# Patient Record
Sex: FEMALE | Race: WHITE | NOT HISPANIC OR LATINO | Employment: UNEMPLOYED | URBAN - METROPOLITAN AREA
[De-identification: names, ages, dates, MRNs, and addresses within clinical notes are randomized per-mention and may not be internally consistent; named-entity substitution may affect disease eponyms.]

---

## 2022-11-04 ENCOUNTER — TELEPHONE (OUTPATIENT)
Dept: GASTROENTEROLOGY | Facility: AMBULARY SURGERY CENTER | Age: 37
End: 2022-11-04

## 2022-11-04 NOTE — TELEPHONE ENCOUNTER
Patients GI provider:  Dr. Delgado    Number to return call: ( [phone]    Reason for call: Pt calling to speak with a nurse due to symptoms, seen in ED yesterday    Scheduled procedure/appointment date if applicable: Apt/procedure N/A

## 2023-01-16 PROBLEM — J98.11 ATELECTASIS OF BOTH LUNGS: Status: ACTIVE | Noted: 2023-01-16

## 2023-04-28 ENCOUNTER — TELEPHONE (OUTPATIENT)
Dept: PSYCHIATRY | Facility: CLINIC | Age: 38
End: 2023-04-28

## 2023-04-28 NOTE — TELEPHONE ENCOUNTER
Contacted patient to inform her that the appointment for 4/28/23 at 9:00a needed to be cancelled due to her provider being out of office  Patient also asked to cancel 5/11/23 at 3:00p and 5/24/23 at 9:00a because she began a new, full-time job  The patient would appreciate a call back from her provider upon her RTO to reschedule  Thank you

## 2023-05-04 ENCOUNTER — TELEPHONE (OUTPATIENT)
Dept: PSYCHIATRY | Facility: CLINIC | Age: 38
End: 2023-05-04

## 2023-05-04 NOTE — TELEPHONE ENCOUNTER
Unfortunately I do not take any late appointments  I do start at 7:30 AM every day  Perhaps she can do a VV early? Otherwise, she may have to transfer care

## 2023-05-04 NOTE — TELEPHONE ENCOUNTER
Writer called patient back to see if she can do early appts and she stated she cannot because she starts work at [employer], I spoke to her about a DAVID to another provider with later hours and she said she will call the office back

## 2023-05-04 NOTE — TELEPHONE ENCOUNTER
Patient is calling regarding cancelling an appointment      Date/Time: 5/8/2023 11:30am    Reason: started new job    Patient was rescheduled: YES [] NO [x]  If yes, when was Patient reschedule for:     Patient requesting call back to reschedule: YES [x] NO []    Patient stated she started a new job and works until Charity Gutierrez went thru providers schedule and did not see anything that could accomodate the patients schedule, please advise

## 2023-05-05 ENCOUNTER — OFFICE VISIT (OUTPATIENT)
Dept: URGENT CARE | Age: 38
End: 2023-05-05

## 2023-05-05 ENCOUNTER — APPOINTMENT (OUTPATIENT)
Dept: URGENT CARE | Age: 38
End: 2023-05-05

## 2023-05-05 VITALS
RESPIRATION RATE: 20 BRPM | BODY MASS INDEX: 27.32 KG/M2 | DIASTOLIC BLOOD PRESSURE: 77 MMHG | SYSTOLIC BLOOD PRESSURE: 120 MMHG | HEIGHT: 66 IN | HEART RATE: 65 BPM | TEMPERATURE: 97.1 F | OXYGEN SATURATION: 99 % | WEIGHT: 170 LBS

## 2023-05-05 DIAGNOSIS — B37.0 ORAL THRUSH: Primary | ICD-10-CM

## 2023-05-05 NOTE — PROGRESS NOTES
[facility]        NAME: Wilberto Robles is a 44 y o  female  : 1985    MRN: [id number]  DATE: May 5, 2023  TIME: 2:55 PM    Assessment and Plan   Oral thrush [B37 0]  1  Oral thrush  al mag oxide-diphenhydramine-lidocaine viscous (MAGIC MOUTHWASH) 1:1:1 suspension    nystatin (MYCOSTATIN) 500,000 units/5 mL suspension            Patient Instructions     Take meds as prescribed  Oral thrush   Follow up with PCP in 3-5 days  Proceed to  ER if symptoms worsen  Chief Complaint     Chief Complaint   Patient presents with    Oral Pain     Patient states she burn her tongue and now she is having mouth , tongue ,swollen and pain for 1 week  Patient states by of the day her pain increase to 8/10  History of Present Illness       HPI   Reports oral pain  Started about 1 week ago  Getting worse  Says tongue feels swollen  Review of records show Hx of use dependency, vit B defficiency and IBS  She says she has not had problems with alcoholism and that last vit B testing was several years ago  Review of Systems   Review of Systems   Constitutional: Negative for fever  HENT: Positive for trouble swallowing (bc of oral pain)  Negative for congestion  Respiratory: Negative for shortness of breath  Gastrointestinal: Negative for diarrhea and vomiting  Neurological: Negative for light-headedness           Current Medications       Current Outpatient Medications:     al mag oxide-diphenhydramine-lidocaine viscous (MAGIC MOUTHWASH) 1:1:1 suspension, Swish and spit 10 mL every 4 (four) hours as needed for mouth pain or discomfort, Disp: 200 mL, Rfl: 0    nystatin (MYCOSTATIN) 500,000 units/5 mL suspension, Apply 5 mL (500,000 Units total) to the mouth or throat 4 (four) times a day Swish and swallow, Disp: 200 mL, Rfl: 0    Banophen 25 MG capsule, , Disp: , Rfl:     Brexpiprazole (Rexulti) 2 MG tablet, Take 1 tablet (2 mg total) by mouth daily, Disp: 90 tablet, Rfl: 1    Elagolix Sodium (Orilissa) 200 MG TABS, Take 200 mg by mouth 2 (two) times a day (Patient not taking: Reported on 3/29/2023), Disp: , Rfl:     hydrOXYzine HCL (ATARAX) 50 mg tablet, Take 1 tablet (50 mg total) by mouth every 6 (six) hours as needed for anxiety, Disp: 90 tablet, Rfl: 2    levothyroxine 137 mcg tablet, Take 1 tablet (137 mcg total) by mouth daily, Disp: 90 tablet, Rfl: 1    LORazepam (ATIVAN) 1 mg tablet, Take 1 tablet (1 mg total) by mouth 2 (two) times a day as needed for anxiety   Do not exceed 10 pills per week (Patient not taking: Reported on 3/29/2023), Disp: 10 tablet, Rfl: 4    mirtazapine (REMERON) 7 5 MG tablet, Take 1 tablet (7 5 mg total) by mouth daily at bedtime (Patient not taking: Reported on 4/5/2023), Disp: 30 tablet, Rfl: 2    oxyCODONE (ROXICODONE) 5 immediate release tablet, PLEASE SEE ATTACHED FOR DETAILED DIRECTIONS, Disp: , Rfl:     pantoprazole (PROTONIX) 40 mg tablet, Take 1 tablet (40 mg total) by mouth 2 (two) times a day before meals (Patient not taking: Reported on 4/5/2023), Disp: 60 tablet, Rfl: 0    prazosin (MINIPRESS) 1 mg capsule, Take 1 capsule (1 mg total) by mouth daily at bedtime Take in addition to 2 mg tablet for a total of 3 mg at bedtime  , Disp: 30 capsule, Rfl: 2    prazosin (MINIPRESS) 2 mg capsule, Take 1 capsule (2 mg total) by mouth daily at bedtime Take in addition to 1 mg tablet for a total of 3 mg at bedtime, Disp: 30 capsule, Rfl: 2    prochlorperazine (COMPAZINE) 5 mg tablet, PRN, Disp: , Rfl:     sertraline (ZOLOFT) 100 mg tablet, Take 2 tablets (200 mg total) by mouth daily, Disp: 180 tablet, Rfl: 2    sertraline (Zoloft) 25 mg tablet, Take 1 tablet (25 mg total) by mouth daily, Disp: 90 tablet, Rfl: 0    tiZANidine (ZANAFLEX) 2 mg tablet, , Disp: , Rfl:     traMADol (ULTRAM) 50 mg tablet, Taking as needed (Patient not taking: Reported on 4/5/2023), Disp: , Rfl:     Current Allergies     Allergies as of 05/05/2023 - Reviewed 05/05/2023   Allergen Reaction Noted    Depakote [valproic acid] Rash 11/13/2017    Keppra [levetiracetam] Rash 07/12/2017    Prochlorperazine Anxiety and Other (See Comments) 11/13/2017    Reglan [metoclopramide] Anxiety 05/21/2017    Amoxicillin Rash 12/07/2018    Ibuprofen GI Intolerance 08/02/2021            The following portions of the patient's history were reviewed and updated as appropriate: allergies, current medications, past family history, past medical history, past social history, past surgical history and problem list      Past Medical History:   Diagnosis Date    Acid reflux     Anxiety     Atypical mole syndrome     last assessed: 9/18/2015    Borderline personality disorder ([facility] [address] [facility]     Cancer ([facility] [address] )     Thyroid CA    Cervical shortening suspected but not found     last assessed: 12/12/2013    Change in bowel habits     diarrhea with blood in stool    Change in mental status     last assessed: 7/11/2017    Chronic cholecystitis     Concussion wth loss of consciousness of 30 minutes or less 02/28/2022    COVID-19 01/2022    Depression     Disease of thyroid gland     thyroid cancer    Dyspepsia     Follicular thyroid cancer ([address] )     Gastric ulcer     GERD (gastroesophageal reflux disease)     Hallucination     Hiatal hernia     Hypothyroid     Irritable bowel syndrome     last assessed: 12/15/2017    Migraine     Psychiatric illness     PTSD (post-traumatic stress disorder)     Rash of hands     medication reaction    Seizures ([facility] [address] [facility]     Self-injurious behavior     Sleep difficulties     Thyroid disorder        Past Surgical History:   Procedure Laterality Date    ABDOMINAL SURGERY      dedra fundiplication    APPENDECTOMY      COLONOSCOPY N/A 10/30/2017    Procedure: COLONOSCOPY;  Surgeon: Dar Vanegas MD;  [facility];   Service: Gastroenterology   [facility] BINH FUNDOPLASTY  11/2015    nissen fundoplication    ESOPHAGOGASTRODUODENOSCOPY N/A 11/13/2017 "Procedure: ESOPHAGOGASTRODUODENOSCOPY (EGD); Surgeon: Blanca Beard MD;  Location: [facility]; Service: Gastroenterology    HYSTERECTOMY      TL[facility]BS    HYSTEROSCOPY W/ ENDOMETRIAL ABLATION  04/29/2016    LAPAROSCOPIC CHOLECYSTECTOMY  2021    LYMPH NODE BIOPSY      excisional    NISSEN FUNDOPLICATION      MD ESOPHAGOGASTRODUODENOSCOPY TRANSORAL DIAGNOSTIC N/A 2/24/2016    Procedure: ESOPHAGOGASTRODUODENOSCOPY (EGD); Surgeon: Preston Del Rio MD;  [facility]; Service: Gastroenterology    MD HYSTEROSCOPY BX ENDOMETRIUM&/POLYPC W/WO D&C N/A 4/29/2016    Procedure: D&C; HYSTEROSCOPY ;  Surgeon: Radha Ceballos DO;  [facility];  Service: Gynecology    MD HYSTEROSCOPY ENDOMETRIAL ABLATION N/A 4/29/2016    Procedure: Zay Dopp ;  Surgeon: Radha Ceballos DO;  [facility];  Service: Gynecology    MD LAPS ABD PRTM&OMENT[facility] DX W[facility] [address] [facility] [address] N/A 5/22/2017    Procedure: LAPAROSCOPY DIAGNOSTIC, APPENDECTOMY;  Surgeon: Sue Gross MD;  [facility];  Service: General    MD LAPS TOTAL HYSTERECT 250 GM/< W/RMVL TUBE/OVARY N/A 9/7/2016    Procedure: TOTAL LAPAROSCOPIC HYSTERECTOMY WITH BILATERAL SALPINGECTOMY ;  Surgeon: Radha Ceballos DO;  [facility];  Service: Gynecology    THYROIDECTOMY  06/2007    total    TUBAL LIGATION      UPPER GASTROINTESTINAL ENDOSCOPY         Family History   Problem Relation Age of Onset    Depression Mother     Depression Father     Diabetes Maternal Grandmother     Hypothyroidism Maternal Grandmother     Skin cancer Maternal Grandmother     Diabetes type II Maternal Grandmother     Crohn's disease Maternal Grandfather     Other Paternal Grandmother         blood dyscrasia    Bipolar disorder Cousin          Medications have been verified          Objective   /77   Pulse 65   Temp (!) 97 1 °F (36 2 °C) (Temporal)   Resp 20   Ht 5' 6\" (1 676 m)   Wt 77 1 kg (170 lb)   SpO2 99%   BMI 27 44 kg/m²   No " LMP recorded  Patient has had a hysterectomy  Physical Exam     Physical Exam  HENT:      Mouth/Throat:      Comments: whitish material on the tongue, removable with a tongue depressor  Consistent with thrush  Increased redness of the tongue and oral mucosa   Also has a foul odor from the mouth

## 2023-05-09 ENCOUNTER — TELEPHONE (OUTPATIENT)
Dept: BEHAVIORAL/MENTAL HEALTH CLINIC | Facility: CLINIC | Age: 38
End: 2023-05-09

## 2023-05-09 NOTE — TELEPHONE ENCOUNTER
The therapist reached out to [facility] in regard to scheduling   Ligia's appointment is scheduled for 6/22/23 at 9:00 AM

## 2023-07-05 ENCOUNTER — DOCUMENTATION (OUTPATIENT)
Dept: BEHAVIORAL/MENTAL HEALTH CLINIC | Facility: CLINIC | Age: 38
End: 2023-07-05

## 2023-07-05 ENCOUNTER — TELEPHONE (OUTPATIENT)
Dept: PSYCHIATRY | Facility: CLINIC | Age: 38
End: 2023-07-05

## 2023-07-05 DIAGNOSIS — F41.1 GAD (GENERALIZED ANXIETY DISORDER): ICD-10-CM

## 2023-07-05 DIAGNOSIS — F60.3 BORDERLINE PERSONALITY DISORDER (HCC): Primary | ICD-10-CM
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
## 2023-07-11 NOTE — TELEPHONE ENCOUNTER
DISCHARGE LETTER for Krystyna Larsen LCSW (certified and regular) placed in outgoing mail on 07/11/23.     Article #:  [id number]    Address:  [address] [place] [address]

## 2023-07-17 ENCOUNTER — TELEPHONE (OUTPATIENT)
Dept: INTERNAL MEDICINE CLINIC | Facility: CLINIC | Age: 38
End: 2023-07-17

## 2023-07-18 ENCOUNTER — OFFICE VISIT (OUTPATIENT)
Dept: INTERNAL MEDICINE CLINIC | Facility: CLINIC | Age: 38
End: 2023-07-18
Payer: COMMERCIAL

## 2023-07-18 VITALS
TEMPERATURE: 98.7 F | OXYGEN SATURATION: 98 % | RESPIRATION RATE: 16 BRPM | BODY MASS INDEX: 28.4 KG/M2 | WEIGHT: 176.7 LBS | HEART RATE: 75 BPM | SYSTOLIC BLOOD PRESSURE: 118 MMHG | DIASTOLIC BLOOD PRESSURE: 80 MMHG | HEIGHT: 66 IN

## 2023-07-18 DIAGNOSIS — J06.9 UPPER RESPIRATORY TRACT INFECTION, UNSPECIFIED TYPE: Primary | ICD-10-CM

## 2023-07-18 PROBLEM — S06.0X1A CONCUSSION WTH LOSS OF CONSCIOUSNESS OF 30 MINUTES OR LESS: Status: RESOLVED | Noted: 2022-02-28 | Resolved: 2023-07-18

## 2023-07-18 PROBLEM — R10.32 LLQ PAIN: Status: RESOLVED | Noted: 2022-03-07 | Resolved: 2023-07-18

## 2023-07-18 PROBLEM — R10.9 ABDOMINAL PAIN: Status: RESOLVED | Noted: 2022-11-20 | Resolved: 2023-07-18

## 2023-07-18 PROBLEM — R56.9 SEIZURE-LIKE ACTIVITY (HCC): Status: RESOLVED | Noted: 2017-07-12 | Resolved: 2023-07-18

## 2023-07-18 PROBLEM — R10.9 INTRACTABLE ABDOMINAL PAIN: Status: RESOLVED | Noted: 2022-11-04 | Resolved: 2023-07-18

## 2023-07-18 PROBLEM — R00.2 HEART PALPITATIONS: Status: RESOLVED | Noted: 2017-07-12 | Resolved: 2023-07-18

## 2023-07-18 PROBLEM — R29.90 MULTIPLE NEUROLOGICAL SYMPTOMS: Status: RESOLVED | Noted: 2021-05-21 | Resolved: 2023-07-18

## 2023-07-18 PROBLEM — K92.1 TARRY STOOLS: Status: RESOLVED | Noted: 2022-11-20 | Resolved: 2023-07-18

## 2023-07-18 PROBLEM — R55 SYNCOPE: Status: RESOLVED | Noted: 2017-10-24 | Resolved: 2023-07-18

## 2023-07-18 PROCEDURE — 99213 OFFICE O/P EST LOW 20 MIN: CPT | Performed by: INTERNAL MEDICINE

## 2023-07-18 RX ORDER — AZITHROMYCIN 250 MG/1
TABLET, FILM COATED ORAL
Qty: 6 TABLET | Refills: 0 | Status: SHIPPED | OUTPATIENT
Start: 2023-07-18 | End: 2023-07-23

## 2023-07-18 NOTE — ASSESSMENT & PLAN NOTE
-c/o metal taste in mouth with sore throat. No thrush noted  -will treat with zpak for presumed URI. Side effects discussed.

## 2023-07-18 NOTE — PROGRESS NOTES
Assessment/Plan:    Problem List Items Addressed This Visit        Respiratory    Upper respiratory tract infection - Primary     -c/o metal taste in mouth with sore throat. No thrush noted  -will treat with zpak for presumed URI. Side effects discussed. Relevant Medications    azithromycin (Zithromax) 250 mg tablet       Subjective:      Patient ID: Jane Somers is a 44 y.o. female. HPI    Had poison oak dermatitis from elbow to fingers, patches down her legs. Was wearing long sleeves because of itchiness. Developed bruises. Seen in ED 6/5. Bruises have subsided significantly. Believes she has thrush. Has sore throat, fatigue, ear pain, chest tightness, jt pains. .  Denies fever, SOB. Has intermittent cough. Occurring for one week. 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 Denies new medications or reflux. The following portions of the patient's history were reviewed and updated as appropriate: allergies, current medications, past family history, past medical history, past social history, past surgical history and problem list.      Current Outpatient Medications:   •  azithromycin (Zithromax) 250 mg tablet, Take 2 tablets (500 mg total) by mouth daily for 1 day, THEN 1 tablet (250 mg total) daily for 4 days. , Disp: 6 tablet, Rfl: 0  •  Brexpiprazole (Rexulti) 2 MG tablet, Take 1 tablet (2 mg total) by mouth daily, Disp: 90 tablet, Rfl: 1  •  hydrOXYzine HCL (ATARAX) 50 mg tablet, Take 1 tablet (50 mg total) by mouth every 6 (six) hours as needed for anxiety, Disp: 90 tablet, Rfl: 2  •  levothyroxine 137 mcg tablet, Take 1 tablet (137 mcg total) by mouth daily, Disp: 90 tablet, Rfl: 1  •  prazosin (MINIPRESS) 1 mg capsule, Take 1 capsule (1 mg total) by mouth daily at bedtime Take in addition to 2 mg tablet for a total of 3 mg at bedtime. , Disp: 30 capsule, Rfl: 2  •  prazosin (MINIPRESS) 2 mg capsule, Take 1 capsule (2 mg total) by mouth daily at bedtime Take in addition to 1 mg tablet for a total of 3 mg at bedtime, Disp: 30 capsule, Rfl: 2  •  prochlorperazine (COMPAZINE) 5 mg tablet, PRN, Disp: , Rfl:   •  sertraline (ZOLOFT) 100 mg tablet, Take 2 tablets (200 mg total) by mouth daily, Disp: 180 tablet, Rfl: 2  •  sertraline (Zoloft) 25 mg tablet, Take 1 tablet (25 mg total) by mouth daily, Disp: 90 tablet, Rfl: 0  •  tiZANidine (ZANAFLEX) 2 mg tablet, , Disp: , Rfl:     Review of Systems   Constitutional: Negative for fever. HENT: Positive for sore throat and trouble swallowing. Negative for congestion, nosebleeds, postnasal drip, rhinorrhea, sinus pressure, sinus pain and sneezing. Respiratory: Positive for cough and chest tightness. Negative for shortness of breath and wheezing. Gastrointestinal: Negative for diarrhea. Denies reflux   Skin: Positive for color change. Objective:    /80 (BP Location: Left arm, Patient Position: Sitting, Cuff Size: Standard)   Pulse 75   Temp 98.7 °F (37.1 °C) (Tympanic Core)   Resp 16   Ht 5' 6" (1.676 m)   Wt 80.2 kg (176 lb 11.2 oz)   SpO2 98%   BMI 28.52 kg/m²      Physical Exam  Vitals reviewed. Constitutional:       General: She is not in acute distress. HENT:      Head: Normocephalic. Right Ear: Tympanic membrane and ear canal normal.      Left Ear: Tympanic membrane and ear canal normal.      Nose:      Comments: Mild R>L nasal turbinate hypertrophy     Mouth/Throat:      Mouth: Mucous membranes are moist.      Pharynx: No oropharyngeal exudate or posterior oropharyngeal erythema. Cardiovascular:      Rate and Rhythm: Normal rate and regular rhythm. Pulses: Normal pulses. Heart sounds: Normal heart sounds. Pulmonary:      Effort: Pulmonary effort is normal.      Breath sounds: Normal breath sounds. Musculoskeletal:      Cervical back: Neck supple. No tenderness. Lymphadenopathy:      Cervical: No cervical adenopathy.    Neurological:      Mental Status: She is alert and oriented to person, place, and time.

## 2023-07-19 ENCOUNTER — TELEPHONE (OUTPATIENT)
Dept: PSYCHIATRY | Facility: CLINIC | Age: 38
End: 2023-07-19

## 2023-07-19 NOTE — PSYCH
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 motivation.  - Counseled to avoid ETOH, illicit substances, and nicotine secondary to the detrimental effects of these substances on mental and physical health. - Encouraged to engage in non-verbal forms of therapy such as art therapy, music therapy, and mindfulness. Aware of 24 hour and weekend coverage for urgent situations accessed by calling [facility] main practice number    Medications Risks/Benefits      Risks, Benefits And Possible Side Effects Of Medications:    Risks, benefits, and possible side effects of medications explained to [facility] including risk of parkinsonian symptoms, Tardive Dyskinesia and metabolic syndrome related to treatment with antipsychotic medications, risk of suicidality and serotonin syndrome related to treatment with antidepressants and risks and benefits of treatment with medications in pregnancy. She verbalizes understanding and agreement for treatment.     Controlled Medication Discussion:     Not applicable    Psychotherapy Provided:     Individual psychotherapy provided: Yes  Counseling was provided during the session today for 16 minutes  Medication education provided to [facility]  Goals discussed during session  Importance of medication and treatment compliance reviewed with Ligia  Cognitive therapy was utilized during the session  Reassurance and supportive therapy provided  Crisis/safety plan discussed with Isak Anaya     Treatment Plan:    Completed and signed during the session: Not applicable - Treatment Plan not due at this session    Visit Time    Visit Start Time: 10:37 AM  Visit Stop Time: 10:58 AM  Total Visit Duration: 20 minutes    Jen Cordero, [address] [place] [address] 07/24/23    This note was completed in part utilizing [address] [place] [address]. Grammatical, translation, syntax errors, random word insertions, spelling mistakes, and incomplete sentences may be an occasional consequence of this system secondary to software limitations with voice recognition, ambient noise, and hardware issues. If you have any questions or concerns about the content, text, or information contained within the body of this dictation, please contact the provider for clarification.

## 2023-07-19 NOTE — TELEPHONE ENCOUNTER
Certificate for the Discharge Letter was signed/received on 7/19/2023. A copy has been scanned into Media.

## 2023-07-19 NOTE — TELEPHONE ENCOUNTER
Patient contacted the office to schedule a follow up visit with provider. Patient is now scheduled for 7/24  at 10:30 virtually.

## 2023-07-20 ENCOUNTER — TELEPHONE (OUTPATIENT)
Dept: BEHAVIORAL/MENTAL HEALTH CLINIC | Facility: CLINIC | Age: 38
End: 2023-07-20

## 2023-07-20 NOTE — TELEPHONE ENCOUNTER
The therapist returned Ligia's phone call in regard to the D/C letter she received. The therapist informed Sarah as to the reason she received the D/C letter. The therapist and Sarah discuss her last visit, work schedule and therapist not calling to check on Ligia's work schedule.  The therapist ask Michael if she wanted to be put back on the therapist schedule and Ligia reports, "I think it's best we part ways."

## 2023-07-24 ENCOUNTER — TELEMEDICINE (OUTPATIENT)
Dept: PSYCHIATRY | Facility: CLINIC | Age: 38
End: 2023-07-24
Payer: COMMERCIAL

## 2023-07-24 DIAGNOSIS — F60.3 BORDERLINE PERSONALITY DISORDER (HCC): ICD-10-CM

## 2023-07-24 DIAGNOSIS — F51.05 INSOMNIA DUE TO MENTAL DISORDER: ICD-10-CM

## 2023-07-24 DIAGNOSIS — F43.10 PTSD (POST-TRAUMATIC STRESS DISORDER): Primary | ICD-10-CM

## 2023-07-24 DIAGNOSIS — F33.0 MILD EPISODE OF RECURRENT MAJOR DEPRESSIVE DISORDER (HCC): ICD-10-CM

## 2023-07-24 DIAGNOSIS — F41.1 GAD (GENERALIZED ANXIETY DISORDER): ICD-10-CM

## 2023-07-24 PROCEDURE — 99213 OFFICE O/P EST LOW 20 MIN: CPT | Performed by: NURSE PRACTITIONER

## 2023-07-24 PROCEDURE — 90833 PSYTX W PT W E/M 30 MIN: CPT | Performed by: NURSE PRACTITIONER

## 2023-07-24 RX ORDER — PRAZOSIN HYDROCHLORIDE 2 MG/1
4 CAPSULE ORAL
Qty: 60 CAPSULE | Refills: 2 | Status: SHIPPED | OUTPATIENT
Start: 2023-07-24

## 2023-07-24 RX ORDER — SERTRALINE HYDROCHLORIDE 25 MG/1
25 TABLET, FILM COATED ORAL DAILY
Qty: 90 TABLET | Refills: 1 | Status: SHIPPED | OUTPATIENT
Start: 2023-07-24

## 2023-07-27 ENCOUNTER — OFFICE VISIT (OUTPATIENT)
Dept: OBGYN CLINIC | Facility: OTHER | Age: 38
End: 2023-07-27
Payer: COMMERCIAL

## 2023-07-27 VITALS
HEIGHT: 66 IN | SYSTOLIC BLOOD PRESSURE: 113 MMHG | BODY MASS INDEX: 28.28 KG/M2 | WEIGHT: 176 LBS | DIASTOLIC BLOOD PRESSURE: 77 MMHG | HEART RATE: 98 BPM

## 2023-07-27 DIAGNOSIS — M25.571 ACUTE RIGHT ANKLE PAIN: Primary | ICD-10-CM

## 2023-07-27 DIAGNOSIS — S93.491A SPRAIN OF ANTERIOR TALOFIBULAR LIGAMENT OF RIGHT ANKLE, INITIAL ENCOUNTER: ICD-10-CM

## 2023-07-27 DIAGNOSIS — M25.671 ANKLE STIFFNESS, RIGHT: ICD-10-CM

## 2023-07-27 DIAGNOSIS — R29.898 ANKLE WEAKNESS: ICD-10-CM

## 2023-07-27 PROCEDURE — 99203 OFFICE O/P NEW LOW 30 MIN: CPT | Performed by: PHYSICIAN ASSISTANT

## 2023-07-27 NOTE — PATIENT INSTRUCTIONS
Crutch Instructions   WHAT YOU NEED TO KNOW:   Crutches are tools that provide support and balance when you walk. You may need 1 or 2 crutches to help support your body weight. You may need crutches if you had surgery or an injury that affects your ability to walk. DISCHARGE INSTRUCTIONS:   Return to the emergency department if:   You have sudden numbness in a hand or arm. Your arm is swollen, red, and warm to the touch. Your fingers feel cold or have cramping pain. Call your doctor if:   Your crutches do not fit. One crutch is longer than the other. Your crutches break or get lost.    The rubber tips of your crutches are split or loose. You get blisters or painful calluses on your hands or armpits. Your armpit is red, sore, or has bumps or pimples. Your arm muscles get weaker the longer you use the crutches. You have questions or concerns about your condition or care. How to use crutches safely:   Support your weight with your arms and hands. Do not support your weight with your armpits. This could hurt the nerves and blood vessels that are in your armpits. Keep your elbow bent when the crutch is in place under your arm. Walk slowly and carefully with crutches. Go up and down stairs and ramps slowly. Stop to rest when you feel tired. Get up slowly to a sitting or standing position. This will help prevent dizziness and fainting. Use your crutches only on firm ground. Use caution when you walk on ice or snow. Wet or waxed floors and smooth cement floors can be slippery. Watch out for small rugs or cords. Create clear pathways between rooms in your home. You may need to move your furniture to do this. Keep your needed items within reach. Carry items in a bag or backpack to keep your hands free. How to walk with crutches:   Place both crutches under your arms. Place your hands on the hand  of the crutches. Place your crutches slightly in front of you.     Position the crutches. The top of the crutches should be about 2 fingers [id number] (about 1½ inches) below your armpits. Place your weight on your hands. The top of the crutches should not press into your armpits. If you have one leg that is injured,  keep it off the floor by bending your knee. Lift the crutches and move them a step ahead of you. Put the rubber ends of the crutches firmly on the ground. Move your injured leg between the crutches and slowly bring your body forward. Shift your weight onto the crutches using your arms. Take a normal step with your uninjured leg. If you are using your crutches for balance,  move your right foot and left crutch forward. Then move your left foot and right crutch forward. Keep walking this way. How to go up stairs with crutches:   Face the stairs. Put the crutches close to the first step. Push onto the crutches and put your uninjured leg on the first step. Put your weight on your uninjured leg that is on the first step. Bring both crutches and the injured leg onto the step at the same time. Make sure the rubber ends of the crutches are completely on the step. When you hold onto a railing with one arm, put both crutches under the other arm. Use the railing to help you go up the stairs. How to go down stairs with crutches:   Stand with the toes of your uninjured leg close to the edge of the step. Bend the knee of your uninjured leg. Slowly lower both crutches along with the injured leg onto the next step. Lean on your crutches. Slowly lower your uninjured leg onto the same step. Place both crutches under one arm while you hold onto the railing with the other arm. How to sit in a chair with crutches:   Make sure the chair is sturdy and will not move. Turn and back up to the chair until you feel the edge of it against the backs of your legs. Keep your injured leg forward. Hold both crutches with one hand.  Use your other hand to reach back and hold on to the chair. Slowly lower your body to the chair. How to get up from a chair with crutches:   Sit on the edge of your chair. Push up with your hands using the crutches or arms of the chair. Put your weight on your uninjured foot as you get up. Keep your injured leg bent at the knee and off the floor. © Copyright [facility] 2022 Information is for End User's use only and may not be sold, redistributed or otherwise used for commercial purposes. The above information is an  only. It is not intended as medical advice for individual conditions or treatments. Talk to your doctor, nurse or pharmacist before following any medical regimen to see if it is safe and effective for you. Ankle Sprain   WHAT YOU NEED TO KNOW:   An ankle sprain happens when 1 or more ligaments in your ankle joint stretch or tear. Ligaments are tough tissues that connect bones. Ligaments support your joints and keep your bones in place. DISCHARGE INSTRUCTIONS:   Return to the emergency department if:   You have severe pain in your ankle. Your foot or toes are cold or numb. Your ankle becomes more weak or unstable (wobbly). You are unable to put any weight on your ankle or foot. Your swelling has increased or returned. Call your doctor if:   Your pain does not go away, even after treatment. You have questions or concerns about your condition or care. Medicines: You may need any of the following:  NSAIDs , such as ibuprofen, help decrease swelling, pain, and fever. This medicine is available with or without a doctor's order. NSAIDs can cause stomach bleeding or kidney problems in certain people. If you take blood thinner medicine, always ask your healthcare provider if NSAIDs are safe for you. Always read the medicine label and follow directions. Acetaminophen  decreases pain and fever. It is available without a doctor's order. Ask how much to take and how often to take it. Follow directions. Read the labels of all other medicines you are using to see if they also contain acetaminophen, or ask your doctor or pharmacist. Acetaminophen can cause liver damage if not taken correctly. Prescription pain medicine  may be given. Ask your healthcare provider how to take this medicine safely. Some prescription pain medicines contain acetaminophen. Do not take other medicines that contain acetaminophen without talking to your healthcare provider. Too much acetaminophen may cause liver damage. Prescription pain medicine may cause constipation. Ask your healthcare provider how to prevent or treat constipation. Take your medicine as directed. Contact your healthcare provider if you think your medicine is not helping or if you have side effects. Tell your provider if you are allergic to any medicine. Keep a list of the medicines, vitamins, and herbs you take. Include the amounts, and when and why you take them. Bring the list or the pill bottles to follow-up visits. Carry your medicine list with you in case of an emergency. Self-care:   Use support devices , such as a brace, cast, or splint, to limit your movement and protect your joint. You may need to use crutches to decrease your pain as you move around. Go to physical therapy  as directed. A physical therapist teaches you exercises to help improve movement and strength, and to decrease pain. Rest  your ankle so that it can heal. Return to normal activities as directed. Apply ice  on your ankle for 15 to 20 minutes every hour or as directed. Use an ice pack, or put crushed ice in a plastic bag. Cover the ice pack or bag with a towel before you put it on your injury. Ice helps prevent tissue damage and decreases swelling and pain. Compress  your ankle. Ask if you should wrap an elastic bandage around your injured ligament.  An elastic bandage provides support and helps decrease swelling and movement so your joint can heal. Wear as long as directed. Elevate  your ankle above the level of your heart as often as you can. This will help decrease swelling and pain. Prop your ankle on pillows or blankets to keep it elevated comfortably. Prevent another ankle sprain:   Let your ankle heal.  Find out how long your ligament needs to heal. Do not do any physical activity until your healthcare provider says it is okay. If you start activity too soon, you may develop a more serious injury. Warm up and stretch before you exercise or play sports. This helps your joints become strong and flexible. Use the right equipment. Always wear shoes that fit well and are made for the activity that you are doing. You may also need ankle supports, elbow and knee pads, or braces. Follow up with your doctor as directed:  Write down your questions so you remember to ask them during your visits. © Copyright [employer] 2022 Information is for End User's use only and may not be sold, redistributed or otherwise used for commercial purposes. The above information is an  only. It is not intended as medical advice for individual conditions or treatments. Talk to your doctor, nurse or pharmacist before following any medical regimen to see if it is safe and effective for you. P.R.I.C.E. Treatment   WHAT YOU NEED TO KNOW:   What is P.R.I.C.E. treatment? P.R.I.C.E. treatment is a 5-step process used to decrease swelling and pain caused by an injury. P.R.I.C.E. stands for protect, rest, ice, compress, and elevate. Start P.R.I.C.E. within 24 to 48 hours of an injury. How do I use P.R.I.C.E. treatment? Protect  your injury from more damage. Support the injured area with a brace or splint. Your healthcare provider will tell you how long to use the brace or splint. Rest  your injured area as directed. You may need to stop using, or keep weight off, the injury for 48 hours or longer.  Your healthcare provider may recommend crutches or another device. Return to your usual activities as directed. Apply ice  on your injured area for 15 to 20 minutes every 4 hours or as directed. Use an ice pack, or put crushed ice in a plastic bag. Cover the bag with a towel before you apply it to your skin. Ice helps prevent tissue damage and decreases swelling and pain. Compress  (keep pressure on) the injured area. Compression will help decrease swelling and support the injured area. Use an elastic bandage, air stirrup, splint, or sling as directed. If you use an elastic bandage, make sure the bandage is not too tight. You should be able to slip 2 fingers between the bandage and your skin. Elevate  the injured area above the level of your heart as often as you can. This will help decrease swelling and pain. Prop the injured area on pillows or blankets to keep it elevated comfortably. When should I seek immediate care? Your pain is severe. You have severe swelling or deformity. You have numbness in the injured area. When should I call my doctor? Your pain and swelling do not go away after a few days. You have questions or concerns about your condition or care. CARE AGREEMENT:   You have the right to help plan your care. Learn about your health condition and how it may be treated. Discuss treatment options with your healthcare providers to decide what care you want to receive. You always have the right to refuse treatment. The above information is an  only. It is not intended as medical advice for individual conditions or treatments. Talk to your doctor, nurse or pharmacist before following any medical regimen to see if it is safe and effective for you. © Copyright [employer] 2022 Information is for End User's use only and may not be sold, redistributed or otherwise used for commercial purposes.

## 2023-07-27 NOTE — PROGRESS NOTES
Orthopaedic Surgery - Office Note  Sabrina Balderas (62 y.o. female)   : 1985   MRN: [id number]  Encounter Date: 2023    Chief Complaint   Patient presents with   • Right Ankle - Pain         Assessment/Plan  Diagnoses and all orders for this visit:    Acute right ankle pain  -     Ambulatory Referral to Physical Therapy; Future  -     Durable Medical Equipment  -     Ambulatory Referral to Sports Medicine; Future    Sprain of anterior talofibular ligament of right ankle, initial encounter  -     Ambulatory Referral to Physical Therapy; Future  -     Durable Medical Equipment  -     Ambulatory Referral to Sports Medicine; Future    Ankle weakness  -     Ambulatory Referral to Physical Therapy; Future  -     Durable Medical Equipment  -     Ambulatory Referral to Sports Medicine; Future    Ankle stiffness, right  -     Ambulatory Referral to Physical Therapy; Future  -     Durable Medical Equipment  -     Ambulatory Referral to Sports Medicine; Future    The diagnosis as well as treatment options were reviewed with the patient in the office today. This injury should not require surgical intervention but will benefit from formal physical therapy. Patient will ice the ankle 20 minutes on 1 hour off 3 times a day. She may weight-bear as tolerated but utilize crutches to avoid any type of limping. She will use aspirin 81 mg 1 tablet twice daily with food for DVT prophylaxis until ambulating normally. I would recommend an oral anti-inflammatory, but patient reports she cannot tolerate them so the best available option will be over-the-counter Tylenol for pain control. Patient does use medicinal marijuana as well but is not a nicotine smoker. She will recheck in 3 to 4 weeks for repeat evaluation with sports medicine. Return for Recheck in 3 to 4 weeks with sports medicine. History of Present Illness  This is a new patient with acute right ankle pain after injury that occurred on 2023. Patient was gardening when the ankle rolled inward causing a pop and extreme pain. Patient was seen at [facility]. Patient was advised at that time to use Tylenol, RICE, and was offered crutches which she initially declined but returned later to obtain. Patient reports she has been using flip-flops since the injury due to inability to fit her foot in his shoe. The pain is located in the lateral aspect of the ankle. She has not had problems like this in the past.  No calf pain or paresthesias are reported. She reports she has been limping but does have crutches. She reports she cannot tolerate oral NSAIDs with her contributing medical histories. Review of Systems  Pertinent items are noted in HPI. All other systems were reviewed and are negative. Physical Exam  /77 (BP Location: Left arm, Patient Position: Sitting, Cuff Size: Standard)   Pulse 98   Ht 5' 6" (1.676 m)   Wt 79.8 kg (176 lb)   BMI 28.41 kg/m²   Cons: Appears well. No apparent distress. Psych: Alert. Oriented x3. Mood and affect normal.  Patient's right ankle has no skin breakdown lesions or signs of infection. She has soft tissue edema over the ATFL where she is point tender. She is nontender on the medial and lateral malleolus. There is no tenderness at the calcaneus. She has no tenderness at the Achilles. She has limited range of motion in all planes secondary to pain and guarding. She is nontender to palpation at the fifth metatarsal.  She has 3 out of 5 strength to dorsiflexion, plantarflexion, inversion, and eversion. Her gait is antalgic. There is no evidence of DVT or infection             Studies Reviewed  XR ANKLE 3+ VW RIGHT    Result Date: 7/26/2023  History:Injury of right ankle, initial encounter . Technique:3 radiographic views of the right ankle . Comparisons: None. Findings: No acute fracture or dislocation visualized. The joint spaces are preserved. There is a plantar calcaneal spur. Bone mineralization appears unremarkable. There is lateral soft tissue swelling. Impressions: Lateral soft tissue swelling. [facility] x-ray report and office note reviewed by myself in the office today. Procedures  No procedures today. Medical, Surgical, Family, and Social History  The patient's medical history, family history, and social history, were reviewed and updated as appropriate.     Past Medical History:   Diagnosis Date   • Abdominal pain 11/20/2022   • Acid reflux    • Anxiety    • Atypical mole syndrome     last assessed: 9/18/2015   • Borderline personality disorder ([address])    • Cancer ([address])     Thyroid CA   • Cervical shortening suspected but not found     last assessed: 12/12/2013   • Change in bowel habits     diarrhea with blood in stool   • Change in mental status     last assessed: 7/11/2017   • Chronic cholecystitis    • Concussion wth loss of consciousness of 30 minutes or less 02/28/2022   • COVID-19 01/2022   • Depression    • Disease of thyroid gland     thyroid cancer   • Dyspepsia    • Follicular thyroid cancer ([address])    • Functional murmur 9/1/2015   • Gastric ulcer    • GERD (gastroesophageal reflux disease)    • Hallucination    • Heart palpitations 7/12/2017   • Hiatal hernia    • Hypothyroid    • Intractable abdominal pain 11/4/2022   • Irritable bowel syndrome     last assessed: 12/15/2017   • LLQ pain 3/7/2022   • Migraine    • Multiple neurological symptoms 5/21/2021   • Psychiatric illness    • PTSD (post-traumatic stress disorder)    • Rash of hands     medication reaction   • Seizure-like activity ([address]) 7/12/2017   • Seizures ([address])    • Self-injurious behavior    • Sleep difficulties    • Syncope 10/24/2017   • Tarry stools 11/20/2022   • Thyroid disorder        Past Surgical History:   Procedure Laterality Date   • ABDOMINAL SURGERY      dedra fundiplication   • APPENDECTOMY     • COLONOSCOPY N/A 10/30/2017    Procedure: COLONOSCOPY;  Surgeon: James Mckeon MD;  Location: [facility]; Service: Gastroenterology   • DENTAL SURGERY     • ESOPHAGOGASTRIC FUNDOPLASTY  11/2015    nissen fundoplication   • ESOPHAGOGASTRODUODENOSCOPY N/A 11/13/2017    Procedure: ESOPHAGOGASTRODUODENOSCOPY (EGD); Surgeon: James Mckeon MD;  Location: [facility]; Service: Gastroenterology   • HYSTERECTOMY      TLHBS   • HYSTEROSCOPY W/ ENDOMETRIAL ABLATION  04/29/2016   • LAPAROSCOPIC CHOLECYSTECTOMY  2021   • LYMPH NODE BIOPSY      excisional   • NISSEN FUNDOPLICATION     • MN ESOPHAGOGASTRODUODENOSCOPY TRANSORAL DIAGNOSTIC N/A 2/24/2016    Procedure: ESOPHAGOGASTRODUODENOSCOPY (EGD); Surgeon: Prateek Loaiza MD;  Location: [facility];   Service: Gastroenterology   • MN HYSTEROSCOPY BX ENDOMETRIUM&/POLYPC W/WO D&C N/A 4/29/2016    Procedure: D&C; HYSTEROSCOPY ;  Surgeon: Paula Sharpe DO;  Location: [facility];  Service: Gynecology   • MN HYSTEROSCOPY ENDOMETRIAL ABLATION N/A 4/29/2016    Procedure: Erin Mcburney ;  Surgeon: Paula Sharpe DO;  Location: [facility];  Service: Gynecology   • MN LAPS ABD PRTM&OMENTUM DX W/WO SPEC BR/WA SPX N/A 5/22/2017    Procedure: LAPAROSCOPY DIAGNOSTIC, APPENDECTOMY;  Surgeon: Messi Neville MD;  Location: [facility];  Service: General   • MN LAPS TOTAL HYSTERECT 250 GM/< W/RMVL TUBE/OVARY N/A 9/7/2016    Procedure: TOTAL LAPAROSCOPIC HYSTERECTOMY WITH BILATERAL SALPINGECTOMY ;  Surgeon: Paula Sharpe DO;  Location: [facility];  Service: Gynecology   • THYROIDECTOMY  06/2007    total   • TUBAL LIGATION     • UPPER GASTROINTESTINAL ENDOSCOPY         Family History   Problem Relation Age of Onset   • Depression Mother    • Depression Father    • Diabetes Maternal Grandmother    • Hypothyroidism Maternal Grandmother    • Skin cancer Maternal Grandmother    • Diabetes type II Maternal Grandmother    • Crohn's disease Maternal Grandfather    • Other Paternal Grandmother         blood dyscrasia   • Bipolar disorder Cousin        Social History     Occupational History   • Occupation: unemployed   Tobacco Use   • Smoking status: Former     Packs/day: 0.25     Years: 5.00     Total pack years: 1.25     Types: Cigarettes     Quit date: 2005     Years since quittin.9   • Smokeless tobacco: Never   Vaping Use   • Vaping Use: Never used   Substance and Sexual Activity   • Alcohol use: Not Currently   • Drug use: Yes     Types: Benzodiazepines, Marijuana     Comment: medical - marijuana; Rx for ativan   • Sexual activity: Not Currently     Partners: Male     Birth control/protection: Surgical       Allergies   Allergen Reactions   • Depakote [Valproic Acid] Rash   • Keppra [Levetiracetam] Rash   • Prochlorperazine Anxiety and Other (See Comments)     Pt denies allergy, states it was "accidentally put in".    Tolerates when given with benadryl   • Reglan [Metoclopramide] Anxiety   • Amoxicillin Rash   • Ibuprofen GI Intolerance     Has ulcer         Current Outpatient Medications:   •  Brexpiprazole (Rexulti) 1 MG tablet, Take 1 tablet (1 mg total) by mouth daily, Disp: 90 tablet, Rfl: 1  •  hydrOXYzine HCL (ATARAX) 50 mg tablet, Take 1 tablet (50 mg total) by mouth every 6 (six) hours as needed for anxiety, Disp: 90 tablet, Rfl: 2  •  levothyroxine 137 mcg tablet, Take 1 tablet (137 mcg total) by mouth daily, Disp: 90 tablet, Rfl: 1  •  prazosin (MINIPRESS) 2 mg capsule, Take 2 capsules (4 mg total) by mouth daily at bedtime, Disp: 60 capsule, Rfl: 2  •  prochlorperazine (COMPAZINE) 5 mg tablet, PRN, Disp: , Rfl:   •  sertraline (ZOLOFT) 100 mg tablet, Take 2 tablets (200 mg total) by mouth daily, Disp: 180 tablet, Rfl: 2  •  sertraline (Zoloft) 25 mg tablet, Take 1 tablet (25 mg total) by mouth daily, Disp: 90 tablet, Rfl: 1  •  tiZANidine (ZANAFLEX) 2 mg tablet, , Disp: , Rfl:       Zeeshan Gandara PA-C

## 2023-07-28 ENCOUNTER — TELEPHONE (OUTPATIENT)
Dept: OBGYN CLINIC | Facility: HOSPITAL | Age: 38
End: 2023-07-28

## 2023-07-28 NOTE — TELEPHONE ENCOUNTER
Caller: Patient    Doctor: Jere Sharif    Reason for call: Patient is calling because today she has a red bruise on the ankle and she would like to know if that is normal or something to be concerned about. She stated she will be uploading a photo into her chart.     Call back#: [phone]

## 2023-08-01 ENCOUNTER — EVALUATION (OUTPATIENT)
Dept: PHYSICAL THERAPY | Facility: CLINIC | Age: 38
End: 2023-08-01
Payer: COMMERCIAL

## 2023-08-01 DIAGNOSIS — R29.898 ANKLE WEAKNESS: ICD-10-CM

## 2023-08-01 DIAGNOSIS — S93.491A SPRAIN OF ANTERIOR TALOFIBULAR LIGAMENT OF RIGHT ANKLE, INITIAL ENCOUNTER: Primary | ICD-10-CM

## 2023-08-01 DIAGNOSIS — M25.671 ANKLE STIFFNESS, RIGHT: ICD-10-CM

## 2023-08-01 DIAGNOSIS — M25.571 ACUTE RIGHT ANKLE PAIN: ICD-10-CM

## 2023-08-01 PROCEDURE — 97110 THERAPEUTIC EXERCISES: CPT | Performed by: PHYSICAL THERAPIST

## 2023-08-01 PROCEDURE — 97161 PT EVAL LOW COMPLEX 20 MIN: CPT | Performed by: PHYSICAL THERAPIST

## 2023-08-01 PROCEDURE — 97140 MANUAL THERAPY 1/> REGIONS: CPT | Performed by: PHYSICAL THERAPIST

## 2023-08-01 NOTE — PROGRESS NOTES
PT Evaluation     Today's date: 2023  Patient name: Nini Stafford  :   MRN: [id number]  Referring provider: JEANMARIE Aponte*  Dx:   Encounter Diagnosis     ICD-10-CM    1. Acute right ankle pain  M25.571 Ambulatory Referral to Physical Therapy      2. Sprain of anterior talofibular ligament of right ankle, initial encounter  S93.491A Ambulatory Referral to Physical Therapy      3. Ankle weakness  R29.898 Ambulatory Referral to Physical Therapy      4. Ankle stiffness, right  M25.671 Ambulatory Referral to Physical Therapy                     Assessment  Assessment details: Pt presents with s/s consistent with a ATFL Gr II-III sprain. Pt will benefit from PT to address impairments and restore function. Impairments: abnormal gait, abnormal or restricted ROM, activity intolerance, impaired balance, impaired physical strength, lacks appropriate home exercise program, pain with function and weight-bearing intolerance    Goals  ST-4 weeks. 1.  Pt will decrease pain > 75%. 2.  Pt will restore normal ROM. 3.  Pt will ambulate without pain. LT-8 weeks. 1.  Pt will decrease pain > 90%. 2.  Pt will resume fitness routine. Plan  Planned modality interventions: low level laser therapy  Planned therapy interventions: manual therapy, neuromuscular re-education, patient education, self care, strengthening, stretching, therapeutic activities, therapeutic exercise, flexibility, functional ROM exercises, gait training, graded activity, graded exercise and home exercise program  Frequency: 2x week  Duration in weeks: 6        Subjective Evaluation    History of Present Illness  Mechanism of injury: Pt is a 44 yof c/o R lateral ankle pain after rolling her ankle into INV when she stepped on a rain spout on 23.     Patient Goals  Patient goals for therapy: decreased edema, decreased pain, improved balance, increased motion, increased strength, independence with ADLs/IADLs and return to sport/leisure activities    Pain  Current pain ratin  At best pain rating: 3  At worst pain rating: 10  Location: R lateral ankle  Quality: dull ache, throbbing and pressure  Relieving factors: ice and medications (elevation)  Aggravating factors: sitting, standing and walking  Progression: no change      Diagnostic Tests  X-ray: abnormal (23 plantar calcaneal spur)        Objective     Active Range of Motion     Right Ankle/Foot   Dorsiflexion (ke): -10 degrees with pain  Plantar flexion: 20 degrees with pain  Inversion: 3 degrees with pain  Eversion: 5 degrees with pain  Great toe flexion: 5 degrees with pain  Great toe extension: 3 degrees with pain  Lesser toes: 10 degrees with pain    Passive Range of Motion     Right Ankle/Foot    Dorsiflexion (ke): -8 degrees with pain  Plantar flexion: 25 degrees with pain  Inversion: 5 degrees with pain  Eversion: 5 degrees with pain  Great toe flexion: 10 degrees with pain  Great toe extension: 10 degrees with pain  Lesser toes: 10 degrees with pain    Additional Passive Range of Motion Details  Edema: 3+. Pt was ambulating without crutches or a brace. Educated to unload utilizing crutches to allow edema to improve will wrap until able to tolerate brace. Strength/Myotome Testing     Right Ankle/Foot   Dorsiflexion: 3-  Plantar flexion: 3-  Inversion: 3-  Eversion: 3-  Great toe flexion: 3-  Great toe extension: 3-    Tests     Right Ankle/Foot   Positive for anterior drawer and inversion talar tilt. Precautions:  WBAT. Dx: R ATFL Gr II-III sprain.     Manuals             R ankle DF/PF PROM             R ankle Laser 6000J                                      Neuro Re-Ed                                                                                                        Ther Ex             Ankle DF/PF AROM 1x20            Supine GA stretch 10"x3            Seated towel scrunches 1x20            Seated heel-toe raises Ther Activity                                       Gait Training             Alter G  20%                        Modalities

## 2023-08-03 ENCOUNTER — APPOINTMENT (OUTPATIENT)
Dept: PHYSICAL THERAPY | Facility: CLINIC | Age: 38
End: 2023-08-03
Payer: COMMERCIAL

## 2023-08-08 ENCOUNTER — OFFICE VISIT (OUTPATIENT)
Dept: PHYSICAL THERAPY | Facility: CLINIC | Age: 38
End: 2023-08-08
Payer: COMMERCIAL

## 2023-08-08 DIAGNOSIS — M25.571 ACUTE RIGHT ANKLE PAIN: Primary | ICD-10-CM

## 2023-08-08 DIAGNOSIS — S93.491A SPRAIN OF ANTERIOR TALOFIBULAR LIGAMENT OF RIGHT ANKLE, INITIAL ENCOUNTER: ICD-10-CM

## 2023-08-08 PROCEDURE — 97110 THERAPEUTIC EXERCISES: CPT | Performed by: PHYSICAL THERAPIST

## 2023-08-08 PROCEDURE — 97116 GAIT TRAINING THERAPY: CPT | Performed by: PHYSICAL THERAPIST

## 2023-08-08 NOTE — PROGRESS NOTES
Daily Note     Today's date: 2023  Patient name: Stacey Ashby  :   MRN: [id number]  Referring provider: JEANMARIE Rodriguez*  Dx:   Encounter Diagnosis     ICD-10-CM    1. Acute right ankle pain  M25.571       2. Sprain of anterior talofibular ligament of right ankle, initial encounter  S93.750I                      Subjective: Pt reports 4/10 lateral ankle pain with amb. Objective: See treatment diary below    Assessment: Edema reduced, ROM improved. Introduced Nishant Quiñones and pt was able to normalize gait. Plan: Cont POC. Precautions:  WBAT. Dx: R ATFL Gr II-III sprain.     Manuals            R ankle DF/PF PROM  10"x5           R ankle Laser 6000J 5000J                                     Neuro Re-Ed                                                                                                        Ther Ex             Ankle DF/PF AROM 1x20 1x20           Supine GA stretch 10"x3 10"x3           Standing towel scrunches 1x20 2#/ 1x30           Seated heel-toe raises             TB PF  Y/ 1x20                                                  Ther Activity                                       Gait Training             Alter G  50% 1 mph x 10 min                        Modalities

## 2023-08-10 ENCOUNTER — APPOINTMENT (OUTPATIENT)
Dept: PHYSICAL THERAPY | Facility: CLINIC | Age: 38
End: 2023-08-10
Payer: COMMERCIAL

## 2023-08-11 DIAGNOSIS — F51.05 INSOMNIA DUE TO MENTAL DISORDER: ICD-10-CM

## 2023-08-11 DIAGNOSIS — F43.10 PTSD (POST-TRAUMATIC STRESS DISORDER): ICD-10-CM

## 2023-08-11 RX ORDER — PRAZOSIN HYDROCHLORIDE 2 MG/1
4 CAPSULE ORAL
Qty: 60 CAPSULE | Refills: 1 | Status: SHIPPED | OUTPATIENT
Start: 2023-08-11

## 2023-08-15 ENCOUNTER — OFFICE VISIT (OUTPATIENT)
Dept: PHYSICAL THERAPY | Facility: CLINIC | Age: 38
End: 2023-08-15
Payer: COMMERCIAL

## 2023-08-15 DIAGNOSIS — S93.491A SPRAIN OF ANTERIOR TALOFIBULAR LIGAMENT OF RIGHT ANKLE, INITIAL ENCOUNTER: ICD-10-CM

## 2023-08-15 DIAGNOSIS — R29.898 ANKLE WEAKNESS: ICD-10-CM

## 2023-08-15 DIAGNOSIS — M25.671 ANKLE STIFFNESS, RIGHT: ICD-10-CM

## 2023-08-15 DIAGNOSIS — M25.571 ACUTE RIGHT ANKLE PAIN: Primary | ICD-10-CM

## 2023-08-15 PROCEDURE — 97110 THERAPEUTIC EXERCISES: CPT | Performed by: PHYSICAL THERAPIST

## 2023-08-15 PROCEDURE — 97116 GAIT TRAINING THERAPY: CPT | Performed by: PHYSICAL THERAPIST

## 2023-08-15 NOTE — PROGRESS NOTES
Daily Note     Today's date: 8/15/2023  Patient name: Preethi Garcia  :   MRN: [id number]  Referring provider: JEANMARIE Renee*  Dx:   Encounter Diagnosis     ICD-10-CM    1. Acute right ankle pain  M25.571       2. Sprain of anterior talofibular ligament of right ankle, initial encounter  S93.491A       3. Ankle weakness  R29.898       4. Ankle stiffness, right  M25.671                      Subjective: Pt reports 5/10 lateral ankle pain with amb. Objective: See treatment diary below    Assessment: Pt demonstrated increased edema, decreased pain-free DF and PF ROM. Pt could not recall a reason. Discussed purchasing an ankle brace for stability and decreasing time on her feet and increasing elevating and icing breaks t/o the day. Plan: Cont POC. Precautions:  WBAT. Dx: R ATFL Gr II-III sprain.     Manuals 8/1 8/8 8/15          R ankle DF/PF PROM  10"x5 5"x20          R ankle Laser 6000J 5000J 8000J                                    Neuro Re-Ed                                                                                                        Ther Ex             Ankle DF/PF AROM 1x20 1x20 1x20          Supine GA stretch 10"x3 10"x3 10"x3          Standing towel scrunches 1x20 2#/ 1x30 2#/ 1x30          Seated heel-toe raises             TB PF  Y/ 1x20 R/ 3x10                                                 Ther Activity                                       Gait Training             Alter G  50% 1 mph x 10 min 50% 1 mph x 12 min                       Modalities

## 2023-08-17 ENCOUNTER — OFFICE VISIT (OUTPATIENT)
Dept: PHYSICAL THERAPY | Facility: CLINIC | Age: 38
End: 2023-08-17
Payer: COMMERCIAL

## 2023-08-17 DIAGNOSIS — S93.491A SPRAIN OF ANTERIOR TALOFIBULAR LIGAMENT OF RIGHT ANKLE, INITIAL ENCOUNTER: ICD-10-CM

## 2023-08-17 DIAGNOSIS — M25.571 ACUTE RIGHT ANKLE PAIN: Primary | ICD-10-CM

## 2023-08-17 PROCEDURE — 97116 GAIT TRAINING THERAPY: CPT | Performed by: PHYSICAL THERAPIST

## 2023-08-17 PROCEDURE — 97110 THERAPEUTIC EXERCISES: CPT | Performed by: PHYSICAL THERAPIST

## 2023-08-17 NOTE — PROGRESS NOTES
Daily Note     Today's date: 2023  Patient name: Ambika Guerrero  :   MRN: [id number]  Referring provider: JENAMARIE Hay*  Dx:   Encounter Diagnosis     ICD-10-CM    1. Acute right ankle pain  M25.571       2. Sprain of anterior talofibular ligament of right ankle, initial encounter  S93.325W                      Subjective: Pt reports some relief with OTC ankle brace, 5-6/10 lateral ankle pain. Objective: See treatment diary below    Assessment: Pt required DF ROM and distal fib DF MWM to gain 0 deg of ROM. Plan: Cont POC. Precautions:  WBAT. Dx: R ATFL Gr II-III sprain.     Manuals 8/1 8/8 8/15 8/17         R ankle DF/PF PROM  10"x5 5"x20 5"x20         R distal fib DF MWM    1x20         R ankle Laser 6000J 5000J 8000J 8000J                                   Neuro Re-Ed                                                                                                        Ther Ex             Ankle DF/PF AROM 1x20 1x20 1x20 1x20         Supine GA stretch 10"x3 10"x3 10"x3 15"x3         Standing towel scrunches 1x20 2#/ 1x30 2#/ 1x30 2#/ 1x30         Seated heel-toe raises             TB PF  Y/ 1x20 R/ 3x10 R/ 3x10                                                Ther Activity                                       Gait Training             Alter G  50% 1 mph x 10 min 50% 1 mph x 12 min 60% 1 mph x 10 min                      Modalities

## 2023-08-21 ENCOUNTER — APPOINTMENT (OUTPATIENT)
Dept: PHYSICAL THERAPY | Facility: CLINIC | Age: 38
End: 2023-08-21
Payer: COMMERCIAL

## 2023-08-23 ENCOUNTER — APPOINTMENT (OUTPATIENT)
Dept: PHYSICAL THERAPY | Facility: CLINIC | Age: 38
End: 2023-08-23
Payer: COMMERCIAL

## 2023-08-24 ENCOUNTER — OFFICE VISIT (OUTPATIENT)
Dept: PHYSICAL THERAPY | Facility: CLINIC | Age: 38
End: 2023-08-24
Payer: COMMERCIAL

## 2023-08-24 ENCOUNTER — APPOINTMENT (OUTPATIENT)
Dept: PHYSICAL THERAPY | Facility: CLINIC | Age: 38
End: 2023-08-24
Payer: COMMERCIAL

## 2023-08-24 DIAGNOSIS — S93.491A SPRAIN OF ANTERIOR TALOFIBULAR LIGAMENT OF RIGHT ANKLE, INITIAL ENCOUNTER: ICD-10-CM

## 2023-08-24 DIAGNOSIS — M25.671 ANKLE STIFFNESS, RIGHT: ICD-10-CM

## 2023-08-24 DIAGNOSIS — M25.571 ACUTE RIGHT ANKLE PAIN: Primary | ICD-10-CM

## 2023-08-24 DIAGNOSIS — R29.898 ANKLE WEAKNESS: ICD-10-CM

## 2023-08-24 PROCEDURE — 97110 THERAPEUTIC EXERCISES: CPT | Performed by: PHYSICAL THERAPIST

## 2023-08-24 PROCEDURE — 97116 GAIT TRAINING THERAPY: CPT | Performed by: PHYSICAL THERAPIST

## 2023-08-24 NOTE — PROGRESS NOTES
Daily Note     Today's date: 2023  Patient name: Oscar Day  : [birth year]  MRN: [id number]  Referring provider: JEANMARIE Harris*  Dx:   Encounter Diagnosis     ICD-10-CM    1. Acute right ankle pain  M25.571       2. Sprain of anterior talofibular ligament of right ankle, initial encounter  S93.491A       3. Ankle weakness  R29.898       4. Ankle stiffness, right  M25.671                      Subjective: Pt reports some relief with OTC ankle brace, 5-6/10 lateral ankle pain. Objective: See treatment diary below    Assessment: Pt required DF ROM and distal fib DF MWM to gain 0 deg of ROM. Plan: Cont POC. Precautions:  WBAT. Dx: R ATFL Gr II-III sprain.     Manuals 8/1 8/8 8/15 8/17 8/24        R ankle DF/PF PROM  10"x5 5"x20 5"x20 5"x20        R distal fib DF MWM    1x20 1x10*        R ankle Laser 6000J 5000J 8000J 8000J 6800J                                  Neuro Re-Ed                                                                                                        Ther Ex             Ankle DF/PF AROM 1x20 1x20 1x20 1x20 1x20        Supine GA stretch 10"x3 10"x3 10"x3 15"x3 15"x3        Standing towel scrunches 1x20 2#/ 1x30 2#/ 1x30 2#/ 1x30         Seated heel-toe raises             TB PF  Y/ 1x20 R/ 3x10 R/ 3x10 G/ 3x10        Alter G DL heel raises     50%/ 3x10        Alter G standing GA stretch     50%/ 15"x3                     Ther Activity                                       Gait Training             Alter G  50% 1 mph x 10 min 50% 1 mph x 12 min 60% 1 mph x 10 min 50% 1 mph x 11 min 60% 1.2 mph x 10 min                    Modalities

## 2023-09-14 ENCOUNTER — APPOINTMENT (OUTPATIENT)
Dept: RADIOLOGY | Age: 38
End: 2023-09-14
Payer: COMMERCIAL

## 2023-09-14 ENCOUNTER — OFFICE VISIT (OUTPATIENT)
Dept: INTERNAL MEDICINE CLINIC | Facility: CLINIC | Age: 38
End: 2023-09-14
Payer: COMMERCIAL

## 2023-09-14 VITALS
WEIGHT: 170 LBS | DIASTOLIC BLOOD PRESSURE: 82 MMHG | BODY MASS INDEX: 27.32 KG/M2 | TEMPERATURE: 98.3 F | HEIGHT: 66 IN | HEART RATE: 122 BPM | OXYGEN SATURATION: 97 % | SYSTOLIC BLOOD PRESSURE: 124 MMHG

## 2023-09-14 DIAGNOSIS — M53.3 SACRAL PAIN: Primary | ICD-10-CM

## 2023-09-14 DIAGNOSIS — M53.3 SACRAL PAIN: ICD-10-CM

## 2023-09-14 DIAGNOSIS — Z91.81 STATUS POST FALL: ICD-10-CM

## 2023-09-14 PROBLEM — J06.9 UPPER RESPIRATORY TRACT INFECTION: Status: RESOLVED | Noted: 2023-07-18 | Resolved: 2023-09-14

## 2023-09-14 PROCEDURE — 72220 X-RAY EXAM SACRUM TAILBONE: CPT

## 2023-09-14 PROCEDURE — 72100 X-RAY EXAM L-S SPINE 2/3 VWS: CPT

## 2023-09-14 PROCEDURE — 99214 OFFICE O/P EST MOD 30 MIN: CPT | Performed by: INTERNAL MEDICINE

## 2023-09-14 RX ORDER — OXYCODONE HYDROCHLORIDE 5 MG/1
5 TABLET ORAL EVERY 8 HOURS PRN
Qty: 10 TABLET | Refills: 0 | Status: SHIPPED | OUTPATIENT
Start: 2023-09-14

## 2023-09-14 RX ORDER — HYDROCODONE BITARTRATE AND ACETAMINOPHEN 5; 325 MG/1; MG/1
TABLET ORAL
COMMUNITY
Start: 2023-07-31

## 2023-09-14 RX ORDER — PREGABALIN 100 MG/1
100 CAPSULE ORAL 3 TIMES DAILY
COMMUNITY
Start: 2023-08-31

## 2023-09-14 NOTE — PROGRESS NOTES
Assessment/Plan:    Problem List Items Addressed This Visit    None  Visit Diagnoses     Sacral pain    -  Primary    -s/p mechanical fall down four steps on 9/9  -r/o LS and sacrum fx  -oxocodone q8h prn severe pain, cont lyrica  -apply ice    Relevant Medications    oxyCODONE (Roxicodone) 5 immediate release tablet    Other Relevant Orders    XR sacrum and coccyx (Completed)    XR spine lumbar 2 or 3 views injury (Completed)    Status post fall        Relevant Medications    oxyCODONE (Roxicodone) 5 immediate release tablet    Other Relevant Orders    XR sacrum and coccyx (Completed)    XR spine lumbar 2 or 3 views injury (Completed)          Subjective:      Patient ID: Misael Camarillo is a 44 y.o. female. HPI    She slipped and fell down four steps. Her tailbone on the front lip of each wooden step as she went down until she hit the bottom of living room. Occurred five days ago. She has been applying ice. Feels burning sensation. She has been laying on her side when she can. Reports pain in her rectum. Has tried pillows propped up and getting up and sitting down has caused pain. She is moving her bowels ok.     The following portions of the patient's history were reviewed and updated as appropriate: allergies, current medications, past family history, past medical history, past social history, past surgical history and problem list.      Current Outpatient Medications:   •  Brexpiprazole (Rexulti) 1 MG tablet, Take 1 tablet (1 mg total) by mouth daily, Disp: 90 tablet, Rfl: 1  •  hydrOXYzine HCL (ATARAX) 50 mg tablet, Take 1 tablet (50 mg total) by mouth every 6 (six) hours as needed for anxiety, Disp: 90 tablet, Rfl: 2  •  levothyroxine 137 mcg tablet, Take 1 tablet (137 mcg total) by mouth daily, Disp: 90 tablet, Rfl: 1  •  oxyCODONE (Roxicodone) 5 immediate release tablet, Take 1 tablet (5 mg total) by mouth every 8 (eight) hours as needed for severe pain (sacrum pain) Max Daily Amount: 15 mg, Disp: 10 tablet, Rfl: 0  •  prazosin (MINIPRESS) 2 mg capsule, Take 2 capsules (4 mg total) by mouth daily at bedtime, Disp: 60 capsule, Rfl: 1  •  pregabalin (LYRICA) 100 mg capsule, Take 100 mg by mouth 3 (three) times a day, Disp: , Rfl:   •  prochlorperazine (COMPAZINE) 5 mg tablet, PRN, Disp: , Rfl:   •  sertraline (ZOLOFT) 100 mg tablet, Take 2 tablets (200 mg total) by mouth daily, Disp: 180 tablet, Rfl: 2  •  sertraline (Zoloft) 25 mg tablet, Take 1 tablet (25 mg total) by mouth daily, Disp: 90 tablet, Rfl: 1  •  HYDROcodone-acetaminophen (NORCO) 5-325 mg per tablet, PLEASE SEE ATTACHED FOR DETAILED DIRECTIONS (Patient not taking: Reported on 9/14/2023), Disp: , Rfl:   •  tiZANidine (ZANAFLEX) 2 mg tablet, , Disp: , Rfl:     Review of Systems   Constitutional: Negative for fever. Gastrointestinal: Negative for blood in stool. Genitourinary: Negative for difficulty urinating. Musculoskeletal: Positive for arthralgias and back pain. Neurological: Positive for numbness. Objective:    /82   Pulse (!) 122   Temp 98.3 °F (36.8 °C)   Ht 5' 6" (1.676 m)   Wt 77.1 kg (170 lb)   SpO2 97%   BMI 27.44 kg/m²      Physical Exam  Constitutional:       Comments: Appears uncomfortable, leaning over exam table   Skin:     Findings: Bruising (14cm bruise over L sacrum, swollen, tender to touch. Faint bruising on lumbar spinous process) present. Neurological:      Mental Status: She is alert and oriented to person, place, and time.

## 2023-10-20 ENCOUNTER — OFFICE VISIT (OUTPATIENT)
Dept: INTERNAL MEDICINE CLINIC | Facility: CLINIC | Age: 38
End: 2023-10-20
Payer: COMMERCIAL

## 2023-10-20 VITALS
HEIGHT: 66 IN | SYSTOLIC BLOOD PRESSURE: 114 MMHG | RESPIRATION RATE: 17 BRPM | WEIGHT: 175 LBS | HEART RATE: 76 BPM | BODY MASS INDEX: 28.12 KG/M2 | TEMPERATURE: 99.8 F | OXYGEN SATURATION: 97 % | DIASTOLIC BLOOD PRESSURE: 90 MMHG

## 2023-10-20 DIAGNOSIS — Z85.850 HISTORY OF THYROID CANCER: ICD-10-CM

## 2023-10-20 DIAGNOSIS — J20.9 ACUTE BRONCHITIS, UNSPECIFIED ORGANISM: ICD-10-CM

## 2023-10-20 DIAGNOSIS — J98.01 BRONCHOSPASM: Primary | ICD-10-CM

## 2023-10-20 PROCEDURE — 99214 OFFICE O/P EST MOD 30 MIN: CPT | Performed by: INTERNAL MEDICINE

## 2023-10-20 RX ORDER — LEVALBUTEROL TARTRATE 45 UG/1
1-2 AEROSOL, METERED ORAL EVERY 4 HOURS PRN
Qty: 15 G | Refills: 0 | Status: SHIPPED | OUTPATIENT
Start: 2023-10-20

## 2023-10-20 RX ORDER — AZITHROMYCIN 250 MG/1
TABLET, FILM COATED ORAL
Qty: 6 TABLET | Refills: 0 | Status: SHIPPED | OUTPATIENT
Start: 2023-10-20 | End: 2023-10-25

## 2023-10-20 RX ORDER — LEVOTHYROXINE SODIUM 137 UG/1
137 TABLET ORAL DAILY
Qty: 90 TABLET | Refills: 0 | Status: SHIPPED | OUTPATIENT
Start: 2023-10-20

## 2023-10-20 RX ORDER — FLUTICASONE FUROATE AND VILANTEROL 100; 25 UG/1; UG/1
1 POWDER RESPIRATORY (INHALATION) DAILY
Qty: 60 BLISTER | Refills: 0 | Status: SHIPPED | OUTPATIENT
Start: 2023-10-20 | End: 2024-04-17

## 2023-10-20 NOTE — PROGRESS NOTES
Assessment/Plan:    Problem List Items Addressed This Visit    None  Visit Diagnoses     Bronchospasm    -  Primary    -secondary to bronchitis  -start BREO and xoponex prn, side effects discussed. Relevant Medications    Fluticasone Furoate-Vilanterol 100-25 mcg/actuation inhaler    levalbuterol (Xopenex HFA) 45 mcg/act inhaler    Acute bronchitis, unspecified organism        -if not improved despite inhalers, then start zpak  -take delsym and mucinex for cough    Relevant Medications    Fluticasone Furoate-Vilanterol 100-25 mcg/actuation inhaler    levalbuterol (Xopenex HFA) 45 mcg/act inhaler    azithromycin (Zithromax) 250 mg tablet          Subjective:      Patient ID: Tracy Saenz is a 44 y.o. female. HPI    Has had allergy symptoms one week ago, did not think much of it. Sneezing, watery eyes improved. All of a sudden had cough 3d ago start then air has felt thick to her. She feels she is working harder to breathe. Throat hurts from coughing. Very minimal rhinorrhea. Has felt feverish, chills/sweats, fatigue. Tested neg for COVID at home. She has not taken anything for her symptoms. The following portions of the patient's history were reviewed and updated as appropriate: allergies, current medications, past family history, past medical history, past social history, past surgical history and problem list.      Current Outpatient Medications:   •  azithromycin (Zithromax) 250 mg tablet, Take 2 tablets (500 mg total) by mouth daily for 1 day, THEN 1 tablet (250 mg total) daily for 4 days. , Disp: 6 tablet, Rfl: 0  •  Brexpiprazole (Rexulti) 1 MG tablet, Take 1 tablet (1 mg total) by mouth daily, Disp: 90 tablet, Rfl: 1  •  Fluticasone Furoate-Vilanterol 100-25 mcg/actuation inhaler, Inhale 1 puff daily Rinse mouth after use., Disp: 60 blister, Rfl: 0  •  hydrOXYzine HCL (ATARAX) 50 mg tablet, Take 1 tablet (50 mg total) by mouth every 6 (six) hours as needed for anxiety, Disp: 90 tablet, Rfl: 2  •  levalbuterol (Xopenex HFA) 45 mcg/act inhaler, Inhale 1-2 puffs every 4 (four) hours as needed for shortness of breath, Disp: 15 g, Rfl: 0  •  levothyroxine 137 mcg tablet, Take 1 tablet (137 mcg total) by mouth daily, Disp: 90 tablet, Rfl: 1  •  prazosin (MINIPRESS) 2 mg capsule, Take 2 capsules (4 mg total) by mouth daily at bedtime, Disp: 60 capsule, Rfl: 1  •  pregabalin (LYRICA) 100 mg capsule, Take 100 mg by mouth 3 (three) times a day, Disp: , Rfl:   •  prochlorperazine (COMPAZINE) 5 mg tablet, PRN, Disp: , Rfl:   •  sertraline (ZOLOFT) 100 mg tablet, Take 2 tablets (200 mg total) by mouth daily, Disp: 180 tablet, Rfl: 2  •  sertraline (Zoloft) 25 mg tablet, Take 1 tablet (25 mg total) by mouth daily, Disp: 90 tablet, Rfl: 1  •  HYDROcodone-acetaminophen (NORCO) 5-325 mg per tablet, PLEASE SEE ATTACHED FOR DETAILED DIRECTIONS (Patient not taking: Reported on 9/14/2023), Disp: , Rfl:   •  oxyCODONE (Roxicodone) 5 immediate release tablet, Take 1 tablet (5 mg total) by mouth every 8 (eight) hours as needed for severe pain (sacrum pain) Max Daily Amount: 15 mg, Disp: 10 tablet, Rfl: 0  •  tiZANidine (ZANAFLEX) 2 mg tablet, , Disp: , Rfl:     Review of Systems   Constitutional:  Positive for appetite change, chills, diaphoresis, fatigue and fever (subjective). HENT:  Positive for rhinorrhea and sore throat. Respiratory:  Positive for cough, chest tightness and shortness of breath. Negative for wheezing. Gastrointestinal:  Negative for abdominal pain, constipation, diarrhea and nausea. Neurological:  Negative for headaches. Objective:    /90 (BP Location: Left arm, Patient Position: Sitting, Cuff Size: Standard)   Pulse 76   Temp 99.8 °F (37.7 °C) (Tympanic Core)   Resp 17   Ht 5' 6" (1.676 m)   Wt 79.4 kg (175 lb)   SpO2 97%   BMI 28.25 kg/m²      Physical Exam  Vitals reviewed. HENT:      Head: Normocephalic.       Right Ear: Tympanic membrane and ear canal normal.      Left Ear: Tympanic membrane and ear canal normal.      Nose: Nose normal.      Mouth/Throat:      Mouth: Mucous membranes are moist.   Cardiovascular:      Rate and Rhythm: Normal rate and regular rhythm. Heart sounds: Normal heart sounds. Pulmonary:      Effort: No respiratory distress. Breath sounds: Normal breath sounds. No wheezing. Comments: Moist cough  Musculoskeletal:      Cervical back: No tenderness. Lymphadenopathy:      Cervical: No cervical adenopathy. Neurological:      Mental Status: She is alert and oriented to person, place, and time.

## 2023-10-24 ENCOUNTER — TELEPHONE (OUTPATIENT)
Dept: INTERNAL MEDICINE CLINIC | Facility: CLINIC | Age: 38
End: 2023-10-24

## 2023-10-24 ENCOUNTER — NURSE TRIAGE (OUTPATIENT)
Dept: OTHER | Facility: OTHER | Age: 38
End: 2023-10-24

## 2023-10-24 NOTE — TELEPHONE ENCOUNTER
She is being treated for URI and is on Zpack and feels worse. Reason for Disposition  • [1] Taking antibiotics > 24 hours AND [2] symptoms WORSE    Answer Assessment - Initial Assessment Questions  1. INFECTION: "What infection is the antibiotic being given for?"        Being treated for lower respiratory infection  2. ANTIBIOTIC: "What antibiotic are you taking" "How many times per day?"      Z pack and Breo. Breo was not picked up due to not covered by insurance. 3. DURATION: "When was the antibiotic started?"      Day 5  4. MAIN CONCERN OR SYMPTOM:  "What is your main concern right now?"      The cough is congesting, and feels crackling in the chest.Foamy secretions when she coughs intermittently. Feeling like she can't sleep well at night due to drowning feeling intermittently. 5. BETTER-SAME-WORSE: "Are you getting better, staying the same, or getting worse compared to when you first started the antibiotics?" If getting worse, ask: "In what way?"       Not feeling better after starting antibiotic. Only thing improved was the nasal discharge. 6. FEVER: "Do you have a fever?" If Yes, ask: "What is your temperature, how was it measured, and when did it start?"      Low grade 99.8  7. SYMPTOMS: "Are there any other symptoms you're concerned about?" If Yes, ask: "When did it start?"      Coughing. Tight chest and feels like " drowning"  8. FOLLOW-UP APPOINTMENT: "Do you have a follow-up appointment with your doctor?"      No follow up appointment    Protocols used:  [facility] on Antibiotic Follow[facility] Call[facility]

## 2023-10-24 NOTE — TELEPHONE ENCOUNTER
Hi Dr. Ruddy Choi,  Patient called and is still experiencing lower respiratory infection symptoms. Her symptoms have gotten worse since Friday and patient is experiencing shortness of breath and chest pain. Patient is asking if she should wait until tomorrow to come in for an appointment or go to the ER or urgent care. Patient can be reached at: [phone]. Thank you.

## 2023-10-30 ENCOUNTER — TELEPHONE (OUTPATIENT)
Dept: PSYCHIATRY | Facility: CLINIC | Age: 38
End: 2023-10-30

## 2023-10-30 NOTE — TELEPHONE ENCOUNTER
Patient is calling regarding cancelling an appointment.     Date/Time: 10/30/2023 10am    Reason:     Patient was rescheduled: YES [] NO [x]  If yes, when was Patient reschedule for:     Patient requesting call back to reschedule: YES [x] NO []

## 2024-01-20 DIAGNOSIS — Z85.850 HISTORY OF THYROID CANCER: ICD-10-CM

## 2024-01-24 RX ORDER — LEVOTHYROXINE SODIUM 137 UG/1
137 TABLET ORAL DAILY
Qty: 30 TABLET | Refills: 0 | Status: SHIPPED | OUTPATIENT
Start: 2024-01-24

## 2024-03-13 ENCOUNTER — TELEPHONE (OUTPATIENT)
Dept: PSYCHIATRY | Facility: CLINIC | Age: 39
End: 2024-03-13

## 2024-03-13 NOTE — TELEPHONE ENCOUNTER
Patient contacted the office to schedule a follow up visit with provider. Patient is now scheduled for 3/20/2024  at 11am in office.

## 2024-03-22 ENCOUNTER — TELEPHONE (OUTPATIENT)
Dept: PSYCHIATRY | Facility: CLINIC | Age: 39
End: 2024-03-22

## 2024-04-10 ENCOUNTER — OFFICE VISIT (OUTPATIENT)
Dept: INTERNAL MEDICINE CLINIC | Facility: CLINIC | Age: 39
End: 2024-04-10
Payer: COMMERCIAL

## 2024-04-10 VITALS
OXYGEN SATURATION: 98 % | HEIGHT: 66 IN | SYSTOLIC BLOOD PRESSURE: 138 MMHG | HEART RATE: 79 BPM | TEMPERATURE: 98.5 F | WEIGHT: 161 LBS | BODY MASS INDEX: 25.88 KG/M2 | RESPIRATION RATE: 16 BRPM | DIASTOLIC BLOOD PRESSURE: 98 MMHG

## 2024-04-10 DIAGNOSIS — E89.0 POSTOPERATIVE HYPOTHYROIDISM: ICD-10-CM

## 2024-04-10 DIAGNOSIS — R53.83 OTHER FATIGUE: Primary | ICD-10-CM

## 2024-04-10 PROCEDURE — 99213 OFFICE O/P EST LOW 20 MIN: CPT | Performed by: INTERNAL MEDICINE

## 2024-04-10 RX ORDER — LEVOTHYROXINE SODIUM 0.15 MG/1
150 TABLET ORAL DAILY
COMMUNITY

## 2024-04-10 NOTE — PROGRESS NOTES
Assessment/Plan:    Problem List Items Addressed This Visit     Postoperative hypothyroidism     -TSH in January 2024 elevated.  Reports dose of levothyroxine was adjusted from 137mcg to 150mcg when she was in the ED.  No follow up after  -will obtain TSH now  -pt advised to return to [facility] for follow up         Relevant Medications    levothyroxine 150 mcg tablet    Other Relevant Orders    TSH, 3rd generation with Free T4 reflex   Other Visit Diagnoses     Other fatigue    -  Primary    -four day h/o joint pains, fatigue, change in appetite, chest pain, palpitations and dizziness  -obtain labs    Relevant Orders    TSH, 3rd generation with Free T4 reflex    Comprehensive metabolic panel    CBC          Subjective:      Patient ID: Ligia Abraham is a 38 y.o. female.    HPI    Complains of joint pains since four days ago.  She is beyond tired.  Has diarrhea.  Has suppressed appetite and is nausea.  Also has stabbing chest pain.  Denies wheezing or SOB.  Gets heart palpitations and dizzy spells.  She has lost weight.  She has taken tylenol with relief.     Levothyroxine was adjusted in January in ED to 150mcg daily.    She denies sick contacts.    The following portions of the patient's history were reviewed and updated as appropriate: allergies, current medications, past family history, past medical history, past social history, past surgical history and problem list.      Current Outpatient Medications:   •  hydrOXYzine HCL (ATARAX) 50 mg tablet, Take 1 tablet (50 mg total) by mouth every 6 (six) hours as needed for anxiety, Disp: 90 tablet, Rfl: 2  •  levalbuterol (Xopenex HFA) 45 mcg/act inhaler, Inhale 1-2 puffs every 4 (four) hours as needed for shortness of breath, Disp: 15 g, Rfl: 0  •  levothyroxine 150 mcg tablet, Take 150 mcg by mouth daily, Disp: , Rfl:   •  prazosin (MINIPRESS) 2 mg capsule, Take 2 capsules (4 mg total) by mouth daily at bedtime, Disp: 60 capsule, Rfl: 1  •  pregabalin (LYRICA) 100 mg  "capsule, Take 100 mg by mouth 3 (three) times a day, Disp: , Rfl:   •  prochlorperazine (COMPAZINE) 5 mg tablet, PRN, Disp: , Rfl:   •  sertraline (ZOLOFT) 100 mg tablet, Take 2 tablets (200 mg total) by mouth daily, Disp: 180 tablet, Rfl: 2  •  Brexpiprazole (Rexulti) 1 MG tablet, Take 1 tablet (1 mg total) by mouth daily (Patient not taking: Reported on 4/10/2024), Disp: 90 tablet, Rfl: 1  •  Fluticasone Furoate-Vilanterol 100-25 mcg/actuation inhaler, Inhale 1 puff daily Rinse mouth after use. (Patient not taking: Reported on 4/10/2024), Disp: 60 blister, Rfl: 0  •  HYDROcodone-acetaminophen (NORCO) 5-325 mg per tablet, PLEASE SEE ATTACHED FOR DETAILED DIRECTIONS (Patient not taking: Reported on 9/14/2023), Disp: , Rfl:   •  levothyroxine 137 mcg tablet, TAKE 1 TABLET BY MOUTH EVERY DAY (Patient not taking: Reported on 4/10/2024), Disp: 30 tablet, Rfl: 0  •  sertraline (Zoloft) 25 mg tablet, Take 1 tablet (25 mg total) by mouth daily (Patient not taking: Reported on 4/10/2024), Disp: 90 tablet, Rfl: 1    Review of Systems   Constitutional:  Positive for appetite change, fatigue and unexpected weight change. Negative for activity change and fever.   HENT:  Negative for congestion, rhinorrhea and sinus pain.    Respiratory:  Negative for cough, shortness of breath and wheezing.    Cardiovascular:  Positive for chest pain and palpitations. Negative for leg swelling.   Gastrointestinal:  Positive for diarrhea, nausea and vomiting. Negative for blood in stool.   Genitourinary:  Negative for dysuria.   Musculoskeletal:  Positive for arthralgias and joint swelling.   Neurological:  Positive for dizziness. Negative for headaches.   Psychiatric/Behavioral:  Positive for sleep disturbance (disturbed).          Objective:    /98 (BP Location: Left arm, Patient Position: Sitting, Cuff Size: Standard)   Pulse 79   Temp 98.5 °F (36.9 °C)   Resp 16   Ht 5' 6\" (1.676 m)   Wt 73 kg (161 lb)   SpO2 98%   BMI 25.99 " kg/m²      Physical Exam  Vitals reviewed.   Constitutional:       General: She is not in acute distress.  Cardiovascular:      Rate and Rhythm: Normal rate and regular rhythm.      Pulses: Normal pulses.      Heart sounds: Normal heart sounds.   Pulmonary:      Effort: Pulmonary effort is normal. No respiratory distress.      Breath sounds: Normal breath sounds. No wheezing.   Musculoskeletal:         General: No swelling.      Right lower leg: No edema.      Left lower leg: No edema.   Neurological:      Mental Status: She is alert.

## 2024-04-11 NOTE — ASSESSMENT & PLAN NOTE
-TSH in January 2024 elevated.  Reports dose of levothyroxine was adjusted from 137mcg to 150mcg when she was in the ED.  No follow up after  -will obtain TSH now  -pt advised to return to [facility] for follow up

## 2024-08-27 DIAGNOSIS — F51.05 INSOMNIA DUE TO MENTAL DISORDER: ICD-10-CM

## 2024-08-27 DIAGNOSIS — F43.10 PTSD (POST-TRAUMATIC STRESS DISORDER): ICD-10-CM

## 2024-08-28 RX ORDER — PRAZOSIN HYDROCHLORIDE 2 MG/1
4 CAPSULE ORAL
Qty: 60 CAPSULE | Refills: 1 | Status: SHIPPED | OUTPATIENT
Start: 2024-08-28

## 2024-09-04 DIAGNOSIS — F41.1 GAD (GENERALIZED ANXIETY DISORDER): ICD-10-CM

## 2024-09-04 DIAGNOSIS — F51.05 INSOMNIA DUE TO MENTAL DISORDER: ICD-10-CM

## 2024-09-04 DIAGNOSIS — F60.3 BORDERLINE PERSONALITY DISORDER (HCC): ICD-10-CM

## 2024-09-04 DIAGNOSIS — F33.1 MDD (MAJOR DEPRESSIVE DISORDER), RECURRENT EPISODE, MODERATE (HCC): ICD-10-CM

## 2024-09-04 DIAGNOSIS — F43.10 PTSD (POST-TRAUMATIC STRESS DISORDER): ICD-10-CM

## 2024-09-04 NOTE — TELEPHONE ENCOUNTER
Please schedule patient for follow up appointment.  Patient has not been seen in over 1 year.  Verify that patient is still taking Zoloft 200 mg daily. Will send medications once medication dose is verified and follow-up appointment is made.

## 2024-09-04 NOTE — TELEPHONE ENCOUNTER
Medication Refill Request     Name of Medication Sertraline  Dose/Frequency 100 mg/ Take 2 tablets by mouth daily.  Quantity 180  Verified pharmacy   [x]  Verified ordering Provider   [x]  Does patient have enough for the next 3 days? Yes [] No [x]  Does patient have a follow-up appointment scheduled? Yes [] No [x]   If so when is appointment: Please advise on scheduling . Pt last seen 7/24/2023 due to insurance issues.

## 2024-09-05 RX ORDER — SERTRALINE HYDROCHLORIDE 100 MG/1
200 TABLET, FILM COATED ORAL DAILY
Qty: 180 TABLET | Refills: 2 | Status: SHIPPED | OUTPATIENT
Start: 2024-09-05

## 2024-09-05 NOTE — TELEPHONE ENCOUNTER
Spoke with the patient in regards to the Zoloft . Patient confirmed she is taking 200 mg a day Patient is also scheduled for a follow up on 10/2/2024  at 8:30 am.

## 2024-10-15 ENCOUNTER — TELEPHONE (OUTPATIENT)
Age: 39
End: 2024-10-15

## 2024-10-15 NOTE — PSYCH
MEDICATION MANAGEMENT NOTE        [facility]      Name and Date of Birth:  Ligia Abraham 38 y.o. 1985 MRN: [id number]    Insurance: Payor: [employer] / Plan: [employer] / Product Type: Blue Fee for Service /     Date of Visit: October 16, 2024    Reason for Visit:   Chief Complaint   Patient presents with    Medication Management    Follow-up       Assessment & Plan  RUBIO (generalized anxiety disorder)  Status: At goal  Plan: Continue Zoloft 200 mg daily  Orders:    sertraline (ZOLOFT) 100 mg tablet; Take 2 tablets (200 mg total) by mouth daily    Post traumatic stress disorder (PTSD)  Status: At goal  Plan: Continue Zoloft 200 mg daily, prazosin 4 mg at bedtime  Orders:    prazosin (MINIPRESS) 2 mg capsule; Take 2 capsules (4 mg total) by mouth daily at bedtime    sertraline (ZOLOFT) 100 mg tablet; Take 2 tablets (200 mg total) by mouth daily    MDD (major depressive disorder), recurrent, in full remission (HCC)  Status: At goal  Plan: Continue Zoloft 200 mg daily  Orders:    sertraline (ZOLOFT) 100 mg tablet; Take 2 tablets (200 mg total) by mouth daily    Borderline personality disorder (HCC)  Orders:    sertraline (ZOLOFT) 100 mg tablet; Take 2 tablets (200 mg total) by mouth daily        Plan   Psychopharmacologically, Ligia endorses mood stability on current medication regimen.  Off of Rexulti for approximately 1 year without issue.  No longer utilizes Atarax.  Continue Zoloft 200 mg daily and prazosin 4 mg at bedtime.        -Discontinue Rexulti  -Continue sertraline 200 mg daily for anxiety, PTSD, and depression  -Continue prazosin to 4 mg at bedtime for nightmares associated with PTSD  -Discontinue Atarax  -Psychotherapy-declines at this time  -Follow up with primary care provider for ongoing medical care  - Educated about healthy life style, risk of falls/sedation and addiction. Patient was receptive to education.  - Medications sent to the  patient's pharmacy for 90 day supply x1 refill  - RTC in 6 months  - The patient was educated about 24 hour and weekend coverage for urgent situations accessed by calling [facility] main practice number  - Patient was educated to call [employer] [facility]icide Prevention [facility] (1-495-713-TALK [[phone]]) for behavioral crisis at anytime or 911 for any safety concerns, or go to nearest ER if the symptoms become overwhelming or unmanageable.    Risks/benefits/alternativies to treatment discussed, including a myriad of potential adverse medication side effects, to which Ligia voiced understanding and consented fully to treatment.  Also, patient is amenable to calling/contacting the outpatient office including this writer if any acute adverse effects of their medication regimen arise in addition to any comments or concerns pertaining to their psychiatric management.      Medications Risks/Benefits      Risks, Benefits And Possible Side Effects Of Medications:    Risks, benefits, and possible side effects of medications explained to Ligia and she verbalizes understanding and agreement for treatment.    Controlled Medication Discussion:     Not applicable         Subjective      Ligia Abraham is a 38 y.o. female, visited for Medication Management and Follow-up, who was personally seen and evaluated today at the [facility] outpatient clinic for follow-up and medication management. Completes psychiatric assessment without difficulty.     At previous outpatient psychiatric appointment with this writer, Prazosin increased to 4 mg HS.   She denies any current adverse medication side effects.      Ligia reports that she went several months without insurance approximately 1 year ago resulting in inability to afford Rexulti and subsequent discontinuation of medication.  She had a safety plan in place to seek out crisis options in the event mood became unstable.  However, this did not occur.   "She tolerated this change without any incident.  Reports that she has not had any issues with her second  since his release.  As such, PTSD symptoms have not been problematic.  No hypervigilance.  Utilizing coping mechanisms when feeling emotions are heightened.  Reports that she feels a significant amount of personal growth over the past year.  States \"I feel like the same person I was prior to developing mental health issues\" adding that she feels symptoms appear to start with postpartum depression and spiral out of control from there.  More recently, she has been struggling with multiple health concerns.  Currently her thyroid is being monitored closely with medication adjustments.  She is experiencing some anxiety symptoms, but is uncertain if this is related to thyroid or actual anxiety symptoms.  Also states that with the levels of her thyroid currently, she should be feeling very depressed, however she is not.  Does find that she is overstimulated with increased levels of social interaction.  Though this is likely her baseline, she does find it interferes with work, children's school activities, and interactions with her ex-.  Prazosin is managing nightmares as she does not experience emotional attachment to the \"silent movies\" that occur while she sleeps.  Overall, she feels medications are appropriately managing her symptoms.  No safety concerns.      Review Of Systems:  Pertinent items are noted in HPI; all others are negative; no recent changes in medications or health status reported.    PHQ-2/9 Depression Screening    Little interest or pleasure in doing things: 2 - more than half the days  Feeling down, depressed, or hopeless: 1 - several days  Trouble falling or staying asleep, or sleeping too much: 3 - nearly every day  Feeling tired or having little energy: 3 - nearly every day  Poor appetite or overeating: 3 - nearly every day  Feeling bad about yourself - or that you are a failure or " have let yourself or your family down: 0 - not at all  Trouble concentrating on things, such as reading the newspaper or watching television: 2 - more than half the days  Moving or speaking so slowly that other people could have noticed. Or the opposite - being so fidgety or restless that you have been moving around a lot more than usual: 0 - not at all  Thoughts that you would be better off dead, or of hurting yourself in some way: 0 - not at all  PHQ-9 Score: 14  PHQ-9 Interpretation: Moderate depression         RUBOI-7 Flowsheet Screening      Flowsheet Row Most Recent Value   Over the last two weeks, how often have you been bothered by the following problems?     Feeling nervous, anxious, or on edge 2   Not being able to stop or control worrying 0   Worrying too much about different things 1   Trouble relaxing  2   Being so restless that it's hard to sit still 0   Becoming easily annoyed or irritable  0   Feeling afraid as if something awful might happen 0   How difficult have these problems made it for you to do your work, take care of things at home, or get along with other people?  Somewhat difficult   RUBIO Score  5            Historical Information    Past Psychiatric History Update:   - No inpatient psychiatric admission since last encounter  - No SA or SIB since last encounter  - No incidence of violent behavior since last encounter    Past Trauma History Update:   - No new onset of abuse or traumatic events since last encounter     Past Medical History:    Past Medical History:   Diagnosis Date    Abdominal pain 11/20/2022    Acid reflux     Anxiety     Atypical mole syndrome     last assessed: 9/18/2015    Borderline personality disorder (HCC)     Cancer (HCC)     Thyroid CA    Cervical shortening suspected but not found     last assessed: 12/12/2013    Change in bowel habits     diarrhea with blood in stool    Change in mental status     last assessed: 7/11/2017    Chronic cholecystitis     [facility]  loss of consciousness of 30 minutes or less 02/28/2022    COVID-19 01/2022    Depression     Disease of thyroid gland     thyroid cancer    Dyspepsia     Follicular thyroid cancer (HCC)     Functional murmur 9/1/2015    Gastric ulcer     GERD (gastroesophageal reflux disease)     Hallucination     Heart palpitations 7/12/2017    Hiatal hernia     Hypothyroid     Intractable abdominal pain 11/4/2022    Irritable bowel syndrome     last assessed: 12/15/2017    LLQ pain 3/7/2022    Migraine     Multiple neurological symptoms 5/21/2021    Psychiatric illness     PTSD (post-traumatic stress disorder)     Rash of hands     medication reaction    Seizure-like activity (HCC) 7/12/2017    Seizures (HCC)     Self-injurious behavior     Sleep difficulties     Syncope 10/24/2017    Tarry stools 11/20/2022    Thyroid disorder     Upper respiratory tract infection 7/18/2023        Past Surgical History:   Procedure Laterality Date    ABDOMINAL SURGERY      dedra fundiplication    APPENDECTOMY      COLONOSCOPY N/A 10/30/2017    Procedure: COLONOSCOPY;  Surgeon: Bi Forde MD;  Location:  GI LAB;  Service: Gastroenterology    DENTAL SURGERY      ESOPHAGOGASTRIC FUNDOPLASTY  11/2015    nissen fundoplication    ESOPHAGOGASTRODUODENOSCOPY N/A 11/13/2017    Procedure: ESOPHAGOGASTRODUODENOSCOPY (EGD);  Surgeon: Bi Forde MD;  Location: [facility] GI LAB;  Service: Gastroenterology    FL LUMBAR PUNCTURE DIAGNOSTIC  7/7/2017    HYSTERECTOMY      TLHBS    HYSTEROSCOPY W/ ENDOMETRIAL ABLATION  04/29/2016    LAPAROSCOPIC CHOLECYSTECTOMY  2021    LYMPH NODE BIOPSY      excisional    NISSEN FUNDOPLICATION      WA ESOPHAGOGASTRODUODENOSCOPY TRANSORAL DIAGNOSTIC N/A 2/24/2016    Procedure: ESOPHAGOGASTRODUODENOSCOPY (EGD);  Surgeon: Vikash Buckner MD;  Location:  GI LAB;  Service: Gastroenterology    WA HYSTEROSCOPY BX ENDOMETRIUM&/POLYPC W/WO D&C N/A 4/29/2016    Procedure: D&C; HYSTEROSCOPY ;  Surgeon: Bernadette Yost DO;  Location:   "MAIN OR;  Service: Gynecology    CA HYSTEROSCOPY ENDOMETRIAL ABLATION N/A 2016    Procedure: NOVASURE ABLATION ;  Surgeon: Bernadette Yost DO;  Location: [facility]IN [facility];  Service: Gynecology    CA LAPS ABD PRTM&OMENTUM DX W/WO SPEC BR/WA SPX N/A 2017    Procedure: LAPAROSCOPY DIAGNOSTIC, APPENDECTOMY;  Surgeon: Julius Griffin MD;  Location: [facility];  Service: General    CA LAPS TOTAL HYSTERECT 250 GM/< W/RMVL TUBE/OVARY N/A 2016    Procedure: TOTAL LAPAROSCOPIC HYSTERECTOMY WITH BILATERAL SALPINGECTOMY ;  Surgeon: Bernadette Yost DO;  Location: [facility];  Service: Gynecology    THYROIDECTOMY  2007    total    TUBAL LIGATION      UPPER GASTROINTESTINAL ENDOSCOPY       Allergies   Allergen Reactions    Depakote [Valproic Acid] Rash    Keppra [Levetiracetam] Rash    Prochlorperazine Anxiety and Other (See Comments)     Pt denies allergy, states it was \"accidentally put in\".   Tolerates when given with benadryl    Reglan [Metoclopramide] Anxiety    Morphine Other (See Comments)     migraine    Amoxicillin Rash    Ibuprofen GI Intolerance     Has ulcer       Substance Abuse History:    Social History     Substance and Sexual Activity   Alcohol Use Not Currently     Social History     Substance and Sexual Activity   Drug Use Yes    Types: Benzodiazepines, Marijuana    Comment: medical - marijuana; Rx for ativan       Social History:    Social History     Socioeconomic History    Marital status: Single     Spouse name: Not on file    Number of children: 4    Years of education: Not on file    Highest education level: Not on file   Occupational History    Occupation: unemployed   Tobacco Use    Smoking status: Former     Current packs/day: 0.00     Average packs/day: 0.3 packs/day for 5.0 years (1.3 ttl pk-yrs)     Types: Cigarettes     Start date: 2000     Quit date: 2005     Years since quittin.1    Smokeless tobacco: Never   Vaping Use    Vaping status: Never Used   Substance and " Sexual Activity    Alcohol use: Not Currently    Drug use: Yes     Types: Benzodiazepines, Marijuana     Comment: medical - marijuana; Rx for ativan    Sexual activity: Not Currently     Partners: Male     Birth control/protection: Surgical   Other Topics Concern    Not on file   Social History Narrative    Not on file     Social Determinants of Health     Financial Resource Strain: Low Risk  (10/2/2024)    Received from [facility]    Overall Financial Resource Strain (CARDIA)     Difficulty of Paying Living Expenses: Not hard at all   Food Insecurity: No Food Insecurity (10/2/2024)    Received from [facility]    Hunger Vital Sign     Worried About Running Out of Food in the Last Year: Never true     Ran Out of Food in the Last Year: Never true   Transportation Needs: No Transportation Needs (10/2/2024)    Received from [facility]    PRAPARE - Transportation     Lack of Transportation (Medical): No     Lack of Transportation (Non-Medical): No   Physical Activity: Not on file   Stress: Not on file   Social Connections: Unknown (6/18/2024)    Received from [facility] [facility]    Social Connections     How often do you feel lonely or isolated from those around you? (Adult - for ages 18 years and over): Not on file   Intimate Partner Violence: Not At Risk (10/2/2024)    Received from [facility]    Humiliation, Afraid, Rape, and Kick questionnaire     Fear of Current or Ex-Partner: No     Emotionally Abused: No     Physically Abused: No     Sexually Abused: No   Housing Stability: Low Risk  (10/2/2024)    Received from [facility]    Housing Stability Vital Sign     Unable to Pay for Housing in the Last Year: No     Number of Times Moved in the Last Year: 0     Homeless in the Last Year: No       Family Psychiatric History:     Family History   Problem Relation Age of Onset    Depression Mother     Depression Father     Diabetes Maternal  Grandmother     Hypothyroidism Maternal Grandmother     Skin cancer Maternal Grandmother     Diabetes type II Maternal Grandmother     Crohn's disease Maternal Grandfather     Other Paternal Grandmother         blood dyscrasia    Bipolar disorder Cousin        History Review: The following portions of the patient's history were reviewed and updated as appropriate: allergies, current medications, past family history, past medical history, past social history, past surgical history and problem list.           Objective      Vital signs in last 24 hours:  There were no vitals filed for this visit.    Mental Status Evaluation:    Appearance age appropriate, casually dressed   Behavior cooperative, calm   Speech normal rate, normal volume, normal pitch   Mood at times anxious   Affect normal range and intensity, appropriate   Thought Processes organized, goal directed   Associations intact associations   Thought Content no overt delusions   Perceptual Disturbances: no auditory hallucinations, no visual hallucinations   Abnormal Thoughts  Risk Potential Suicidal ideation - None  Homicidal ideation - None  Potential for aggression - No   Orientation oriented to: person, place, time/date, and situation   Memory recent and remote memory grossly intact   Consciousness alert and awake   Attention Span Concentration Span attention span and concentration are age appropriate   Intellect appears to be of average intelligence   Insight intact   Judgement intact   Muscle Strength and  Gait normal muscle strength and normal muscle tone, normal gait and normal balance   Motor activity no abnormal movements   Language no difficulty naming common objects, no difficulty repeating a phrase, no difficulty writing a sentence   Fund of Knowledge adequate knowledge of current events  adequate fund of knowledge regarding past history  adequate fund of knowledge regarding vocabulary    Pain none   Pain Scale 0     Laboratory Results: I have  personally reviewed all pertinent laboratory/tests results  Most Recent Labs:   Lab Results   Component Value Date    WBC 10.16 01/11/2023    RBC 4.46 01/11/2023    HGB 13.9 01/11/2023    HCT 41.2 01/11/2023     01/11/2023    RDW 13.4 01/11/2023    NEUTROABS 7.73 (H) 01/11/2023     11/11/2015    SODIUM 136 10/03/2024    K 4.0 10/03/2024     10/03/2024    CO2 27 10/03/2024    BUN 10 10/03/2024    CREATININE 0.80 10/03/2024    GLUCOSE 114 05/22/2017    CALCIUM 8.7 10/03/2024    AST 20 10/03/2024    ALT 12 10/03/2024    ALKPHOS 62 10/03/2024    PROT 7.0 09/17/2015    TP 6.4 10/03/2024    BILITOT 0.54 09/17/2015    TBILI 0.4 10/03/2024    CHOL 128 09/17/2015    CHOLESTEROL 177 08/29/2021    TRIG 110 08/29/2021    HDL 42 08/29/2021    LDLCALC 113 (H) 08/29/2021    NONHDLC 135 08/29/2021    BYL1VOGPZASG 103.000 (H) 05/02/2022    FREET4 0.26 (L) 10/03/2024    T3FREE 3.11 12/17/2021    PREGTESTUR NEGATIVE 08/17/2017    PREGSERUM Negative 08/01/2022    HCGQUANT <2 07/13/2016    RPR Non-Reactive 08/29/2021               Current Outpatient Medications   Medication Sig Dispense Refill    levothyroxine 150 mcg tablet Take 250 mcg by mouth daily      prazosin (MINIPRESS) 2 mg capsule Take 2 capsules (4 mg total) by mouth daily at bedtime 180 capsule 2    prochlorperazine (COMPAZINE) 5 mg tablet PRN      sertraline (ZOLOFT) 100 mg tablet Take 2 tablets (200 mg total) by mouth daily 180 tablet 2     No current facility-administered medications for this visit.         Psychotherapy Provided:     Individual psychotherapy provided: No   Psychoeducation provided to the patient and was educated about the importance of compliance with the medications and psychiatric treatment  Supportive psychotherapy provided to the patient    Treatment Plan:    Completed and signed during the session: Yes - with Ligia    Visit Time    Visit Start Time: 0800  Visit Stop Time: 0830  Total Visit Duration:  30 minutes    Ying Dan,  RACHAEL 10/16/24    This note was completed in part utilizing Dragon dictation Software. Grammatical, translation, syntax errors, random word insertions, spelling mistakes, and incomplete sentences may be an occasional consequence of this system secondary to software limitations with voice recognition, ambient noise, and hardware issues. If you have any questions or concerns about the content, text, or information contained within the body of this dictation, please contact the provider for clarification.

## 2024-10-15 NOTE — TELEPHONE ENCOUNTER
Patient contacted the office to schedule a follow up visit with provider. Patient is now scheduled for 10/16/24  at 8am in office.

## 2024-10-16 ENCOUNTER — OFFICE VISIT (OUTPATIENT)
Dept: PSYCHIATRY | Facility: CLINIC | Age: 39
End: 2024-10-16
Payer: COMMERCIAL

## 2024-10-16 DIAGNOSIS — F60.3 BORDERLINE PERSONALITY DISORDER (HCC): ICD-10-CM

## 2024-10-16 DIAGNOSIS — F33.42 MDD (MAJOR DEPRESSIVE DISORDER), RECURRENT, IN FULL REMISSION (HCC): ICD-10-CM

## 2024-10-16 DIAGNOSIS — F41.1 GAD (GENERALIZED ANXIETY DISORDER): Primary | ICD-10-CM

## 2024-10-16 DIAGNOSIS — F43.10 POST TRAUMATIC STRESS DISORDER (PTSD): ICD-10-CM

## 2024-10-16 PROBLEM — F33.1 MDD (MAJOR DEPRESSIVE DISORDER), RECURRENT EPISODE, MODERATE (HCC): Status: ACTIVE | Noted: 2024-10-16

## 2024-10-16 PROBLEM — F10.99 ALCOHOL USE, UNSPECIFIED WITH UNSPECIFIED ALCOHOL-INDUCED DISORDER (HCC): Status: RESOLVED | Noted: 2018-01-25 | Resolved: 2024-10-16

## 2024-10-16 PROBLEM — F10.21 ALCOHOL USE DISORDER, MODERATE, IN EARLY REMISSION, DEPENDENCE (HCC): Status: RESOLVED | Noted: 2020-04-28 | Resolved: 2024-10-16

## 2024-10-16 PROCEDURE — 99214 OFFICE O/P EST MOD 30 MIN: CPT | Performed by: NURSE PRACTITIONER

## 2024-10-16 RX ORDER — PRAZOSIN HYDROCHLORIDE 2 MG/1
4 CAPSULE ORAL
Qty: 180 CAPSULE | Refills: 2 | Status: SHIPPED | OUTPATIENT
Start: 2024-10-16

## 2024-10-16 RX ORDER — SERTRALINE HYDROCHLORIDE 100 MG/1
200 TABLET, FILM COATED ORAL DAILY
Qty: 180 TABLET | Refills: 2 | Status: SHIPPED | OUTPATIENT
Start: 2024-10-16

## 2024-10-16 NOTE — ASSESSMENT & PLAN NOTE
Status: At goal  Plan: Continue Zoloft 200 mg daily, prazosin 4 mg at bedtime  Orders:    prazosin (MINIPRESS) 2 mg capsule; Take 2 capsules (4 mg total) by mouth daily at bedtime    sertraline (ZOLOFT) 100 mg tablet; Take 2 tablets (200 mg total) by mouth daily

## 2024-10-16 NOTE — ASSESSMENT & PLAN NOTE
Status: At goal  Plan: Continue Zoloft 200 mg daily  Orders:    sertraline (ZOLOFT) 100 mg tablet; Take 2 tablets (200 mg total) by mouth daily

## 2024-10-16 NOTE — BH TREATMENT PLAN
TREATMENT PLAN (Medication Management Only)        [facility]    Name and Date of Birth:  Ligia Abraahm 38 y.o. 1985  Date of Treatment Plan: October 16, 2024  Diagnosis/Diagnoses:    1. RUBIO (generalized anxiety disorder)    2. PROVISIONAL PTSD (post-traumatic stress disorder)    3. Mild episode of recurrent major depressive disorder (HCC)    4. Borderline personality disorder (HCC)      Strengths/Personal Resources for Self-Care: supportive friends, taking medications as prescribed, ability to adapt to life changes, ability to communicate needs, ability to communicate well, ability to listen, ability to reason, ability to understand psychiatric illness, average or above intelligence, general fund of knowledge, independence, motivation for treatment, ability to negotiate basic needs, being resoureceful, self-reliance, special hobby/interest, willingness to work on problems, work skills.  Area/Areas of need (in own words):  Maintain current status with mental health  1. Long Term Goal:  Continue to handle what life throws at me in a healthy manor .  Target Date:6 months - 4/16/2025  Person/Persons responsible for completion of goal: Ligia  2.  Short Term Objective (s) - How will we reach this goal?:   A. Provider new recommended medication/dosage changes and/or continue medication(s): continue current medications as prescribed.  B. Visit family frequently.  C.  Prioritizing self-care .  Target Date:6 months - 4/16/2025  Person/Persons Responsible for Completion of Goal: Ligia  Progress Towards Goals: significant progress  Treatment Modality: medication management every 3 months  Review due 180 days from date of this plan: 6 months - 4/16/2025  Expected length of service: ongoing treatment  My Physician/PA/NP and I have developed this plan together and I agree to work on the goals and objectives. I understand the treatment goals that were developed for my  treatment.

## 2024-10-16 NOTE — ASSESSMENT & PLAN NOTE
>>ASSESSMENT AND PLAN FOR MILD EPISODE OF RECURRENT MAJOR DEPRESSIVE DISORDER (HCC) WRITTEN ON 10/16/2024  8:07 AM BY RACHAEL JAIMES

## 2025-02-10 ENCOUNTER — TELEPHONE (OUTPATIENT)
Dept: PSYCHIATRY | Facility: CLINIC | Age: 40
End: 2025-02-10

## 2025-02-10 NOTE — TELEPHONE ENCOUNTER
Spoke with patient and scheduled Medication Management  DAVID for 6/4/2025 due to provider leaving.

## 2025-02-14 ENCOUNTER — APPOINTMENT (OUTPATIENT)
Dept: RADIOLOGY | Facility: OTHER | Age: 40
End: 2025-02-14
Payer: COMMERCIAL

## 2025-02-14 ENCOUNTER — OFFICE VISIT (OUTPATIENT)
Dept: OBGYN CLINIC | Facility: OTHER | Age: 40
End: 2025-02-14
Payer: COMMERCIAL

## 2025-02-14 VITALS — WEIGHT: 161 LBS | HEIGHT: 66 IN | BODY MASS INDEX: 25.88 KG/M2

## 2025-02-14 DIAGNOSIS — G89.29 CHRONIC PAIN OF RIGHT ANKLE: ICD-10-CM

## 2025-02-14 DIAGNOSIS — S99.911A INJURY OF RIGHT ANKLE, INITIAL ENCOUNTER: Primary | ICD-10-CM

## 2025-02-14 DIAGNOSIS — M25.571 CHRONIC PAIN OF RIGHT ANKLE: ICD-10-CM

## 2025-02-14 DIAGNOSIS — S99.911A INJURY OF RIGHT ANKLE, INITIAL ENCOUNTER: ICD-10-CM

## 2025-02-14 PROCEDURE — 99214 OFFICE O/P EST MOD 30 MIN: CPT | Performed by: FAMILY MEDICINE

## 2025-02-14 PROCEDURE — 73610 X-RAY EXAM OF ANKLE: CPT

## 2025-02-14 NOTE — PATIENT INSTRUCTIONS
I explained the patient a chronic ankle pain could be due to a possible persistent bony injury known as osteochondral lesion.  Recommend MRI evaluation for definitive diagnoses.

## 2025-02-14 NOTE — PROGRESS NOTES
1. Injury of right ankle, initial encounter  XR ankle 3+ vw right    MRI ankle/heel right  wo contrast      2. Chronic pain of right ankle  MRI ankle/heel right  wo contrast        Orders Placed This Encounter   Procedures    XR ankle 3+ vw right    MRI ankle/heel right  wo contrast        IMAGING STUDIES: (I personally reviewed images in PACS and report):  Xray right ankle 2/14/25:  Lucency lateral talar dome concerning for osteochondral lesion         PAST REPORTS:        ASSESSMENT/PLAN:  Chronic right ankle pain x 2 years after ankle sprain  Tenderness lateral talar dome with lucency concerning for OCD on x-ray    Refractory to formal physical therapy as well as continued home exercise program for the last 2 years since initial injury event    Repeat X-ray next visit: None    Return for Follow-up after MRI is completed for review.    Patient instructions below verbally summarized in person during encounter:  Patient Instructions   I explained the patient a chronic ankle pain could be due to a possible persistent bony injury known as osteochondral lesion.  Recommend MRI evaluation for definitive diagnoses.      __________________________________________________________________________    HISTORY OF PRESENT ILLNESS:  Evaluation of chronic lateral right ankle pain  swelling and clicking ongoing x 2 years since fall twsiting ankle in a ditch. Persistent swelling for last 2 years. No resolution with OTC measures. Worse after standing long period of time. Refractory to PT during at time of initial injury event and has been performing routine home exercise program for ankle without relief.          Review of Systems      Following history reviewed and update:    Past Medical History:   Diagnosis Date    Abdominal pain 11/20/2022    Acid reflux     Anxiety     Atypical mole syndrome     last assessed: 9/18/2015    Borderline personality disorder (HCC)     Cancer (HCC)     Thyroid CA    Cervical shortening suspected but  not found     last assessed: 12/12/2013    Change in bowel habits     diarrhea with blood in stool    Change in mental status     last assessed: 7/11/2017    Chronic cholecystitis     Concussion wth loss of consciousness of 30 minutes or less 02/28/2022    COVID-19 01/2022    Depression     Disease of thyroid gland     thyroid cancer    Dyspepsia     Follicular thyroid cancer (HCC)     Functional murmur 9/1/2015    Gastric ulcer     GERD (gastroesophageal reflux disease)     Hallucination     Heart palpitations 7/12/2017    Hiatal hernia     Hypothyroid     Intractable abdominal pain 11/4/2022    Irritable bowel syndrome     last assessed: 12/15/2017    LLQ pain 3/7/2022    Migraine     Multiple neurological symptoms 5/21/2021    Psychiatric illness     PTSD (post-traumatic stress disorder)     Rash of hands     medication reaction    Seizure-like activity (HCC) 7/12/2017    Seizures (HCC)     Self-injurious behavior     Sleep difficulties     Syncope 10/24/2017    Tarry stools 11/20/2022    Thyroid disorder     Upper respiratory tract infection 7/18/2023     Past Surgical History:   Procedure Laterality Date    ABDOMINAL SURGERY      dedra fundiplication    APPENDECTOMY      COLONOSCOPY N/A 10/30/2017    Procedure: COLONOSCOPY;  Surgeon: Bi Forde MD;  Location:  GI LAB;  Service: Gastroenterology    DENTAL SURGERY      ESOPHAGOGASTRIC FUNDOPLASTY  11/2015    nissen fundoplication    ESOPHAGOGASTRODUODENOSCOPY N/A 11/13/2017    Procedure: ESOPHAGOGASTRODUODENOSCOPY (EGD);  Surgeon: Bi Forde MD;  Location: [facility];  Service: Gastroenterology    FL LUMBAR PUNCTURE DIAGNOSTIC  7/7/2017    HYSTERECTOMY      TLHBS    HYSTEROSCOPY W/ ENDOMETRIAL ABLATION  04/29/2016    LAPAROSCOPIC CHOLECYSTECTOMY  2021    LYMPH NODE BIOPSY      excisional    NISSEN FUNDOPLICATION      VT ESOPHAGOGASTRODUODENOSCOPY TRANSORAL DIAGNOSTIC N/A 2/24/2016    Procedure: ESOPHAGOGASTRODUODENOSCOPY (EGD);  Surgeon: Vikash Buckner MD;   Location: [facility];  Service: Gastroenterology    MN HYSTEROSCOPY BX ENDOMETRIUM&/POLYPC W/WO D&C N/A 2016    Procedure: D&C; HYSTEROSCOPY ;  Surgeon: Bernadette Yost DO;  Location: [facility];  Service: Gynecology    MN HYSTEROSCOPY ENDOMETRIAL ABLATION N/A 2016    Procedure: NOVASURE ABLATION ;  Surgeon: Bernadette Yost DO;  Location: [facility];  Service: Gynecology    MN LAPS ABD PRTM&OMENTUM DX W/WO SPEC BR/WA SPX N/A 2017    Procedure: LAPAROSCOPY DIAGNOSTIC, APPENDECTOMY;  Surgeon: Julius Griffin MD;  Location: [facility];  Service: General    MN LAPS TOTAL HYSTERECT 250 GM/< W/RMVL TUBE/OVARY N/A 2016    Procedure: TOTAL LAPAROSCOPIC HYSTERECTOMY WITH BILATERAL SALPINGECTOMY ;  Surgeon: Bernadette Yost DO;  Location: [facility];  Service: Gynecology    THYROIDECTOMY  2007    total    TUBAL LIGATION      UPPER GASTROINTESTINAL ENDOSCOPY       Social History   Social History     Substance and Sexual Activity   Alcohol Use Not Currently     Social History     Substance and Sexual Activity   Drug Use Yes    Types: Benzodiazepines, Marijuana    Comment: medical - marijuana; Rx for ativan     Social History     Tobacco Use   Smoking Status Former    Current packs/day: 0.00    Average packs/day: 0.3 packs/day for 5.0 years (1.3 ttl pk-yrs)    Types: Cigarettes    Start date: 2000    Quit date: 2005    Years since quittin.4   Smokeless Tobacco Never     Family History   Problem Relation Age of Onset    Depression Mother     Depression Father     Diabetes Maternal Grandmother     Hypothyroidism Maternal Grandmother     Skin cancer Maternal Grandmother     Diabetes type II Maternal Grandmother     Crohn's disease Maternal Grandfather     Other Paternal Grandmother         blood dyscrasia    Bipolar disorder Cousin      Allergies   Allergen Reactions    Depakote [Valproic Acid] Rash    Keppra [Levetiracetam] Rash    Prochlorperazine Anxiety and Other (See Comments)     Pt  "denies allergy, states it was \"accidentally put in\".   Tolerates when given with benadryl    Reglan [Metoclopramide] Anxiety    Morphine Other (See Comments)     migraine    Amoxicillin Rash    Ibuprofen GI Intolerance     Has ulcer          Physical Exam  Ht 5' 6\" (1.676 m)   Wt 73 kg (161 lb)   BMI 25.99 kg/m²         Ortho Exam    RIGHT ANKLE EXAM  Observation  GAIT:  normal    Inspection  Erythema: no  Ecchymosis: no  Edema:  none    Tenderness  Proximal Fibula: no  (Maisonneuve frx)  AiTFL: no  (2cm proximal-medial to tip lateral malleolus 92% sens, 29% spec)  ATFL: +  CFL: no  PTFL: no  Achilles:  no  Deltoid: No  Peroneal: no  Tibialis Anterior: no  Tibialis Posterior: no    Bony Tenderpoints:  Lateral Malleolus: no  Base of 5th MT: no  Medial Malleolus: no  Navicular: no  Talar dome: + Anterior lateral    ROM  Dorsiflexion: intact  Plantarflexion: intact    Muscle Strength  Pronation: intact without pain  Supination: intact without pain    Calcaneal Squeeze: negative    __________________________________________________________________________  Procedures                  "

## 2025-03-06 ENCOUNTER — TELEPHONE (OUTPATIENT)
Age: 40
End: 2025-03-06

## 2025-03-06 ENCOUNTER — OFFICE VISIT (OUTPATIENT)
Age: 40
End: 2025-03-06
Payer: COMMERCIAL

## 2025-03-06 VITALS
WEIGHT: 163 LBS | RESPIRATION RATE: 17 BRPM | SYSTOLIC BLOOD PRESSURE: 120 MMHG | HEART RATE: 68 BPM | HEIGHT: 66 IN | OXYGEN SATURATION: 98 % | BODY MASS INDEX: 26.2 KG/M2 | DIASTOLIC BLOOD PRESSURE: 88 MMHG

## 2025-03-06 DIAGNOSIS — F41.1 GENERALIZED ANXIETY DISORDER WITH PANIC ATTACKS: Primary | ICD-10-CM

## 2025-03-06 DIAGNOSIS — E89.0 POSTOPERATIVE HYPOTHYROIDISM: ICD-10-CM

## 2025-03-06 DIAGNOSIS — F41.0 GENERALIZED ANXIETY DISORDER WITH PANIC ATTACKS: Primary | ICD-10-CM

## 2025-03-06 PROCEDURE — 99214 OFFICE O/P EST MOD 30 MIN: CPT | Performed by: INTERNAL MEDICINE

## 2025-03-06 RX ORDER — ALPRAZOLAM 1 MG/1
1 TABLET ORAL 2 TIMES DAILY PRN
Qty: 60 TABLET | Refills: 0 | Status: SHIPPED | OUTPATIENT
Start: 2025-03-06

## 2025-03-06 NOTE — ASSESSMENT & PLAN NOTE
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

## 2025-03-06 NOTE — TELEPHONE ENCOUNTER
Caller: Patient     Doctor: Dr. New    Reason for call: Patient called requested to be sedated for MRI. Please advise.    Call back#: [phone]

## 2025-03-06 NOTE — ASSESSMENT & PLAN NOTE
-moderate in severity  -heightened anxiety over the past few months and is currently in transition between psychiatrists.  -continue sertraline 200mcg daily  -add short term alprazolam 1mg twice daily prn, PDMP queried  Orders:  •  ALPRAZolam (XANAX) 1 mg tablet; Take 1 tablet (1 mg total) by mouth 2 (two) times a day as needed for anxiety

## 2025-03-06 NOTE — PROGRESS NOTES
Name: Ligia Abraham      : 1985      MRN: [id number]  Encounter Provider: Eunice Dhaliwal DO  Encounter Date: 3/6/2025   Encounter department: [facility] INTERNAL MEDICINE  :  Assessment & Plan  Generalized anxiety disorder with panic attacks  -moderate in severity  -heightened anxiety over the past few months and is currently in transition between psychiatrists.  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  Has a new appt with psychiatrist until .  However, her stress  level over the past few months have exponentially grown, normally she would reach out to her psychiatrist.  In the past dose of sertraline was adjusted to 225mg but has had side effects.    She has been on xanax, lorazepam and clonazepam previously.  Reports anxiety attacks, she gets palpitations.  For example, this morning she was doing her taxes and found she is not going to get refund as much as she thought and brought on 45 minutes of panic.  Has eliminated caffeine.  She has not been drinking alcohol.    Has medical marijuana card, uses dabber for endometriosis.  PHQ-2/9 Depression Screening    Little interest or pleasure in doing things: 0 - not at all  Feeling down, depressed, or hopeless: 0 - not at all  Trouble falling or staying asleep, or sleeping too much: 0 - not at all  Feeling tired or having little energy: 0 - not at all  Poor appetite or overeatin - not at all  Feeling bad about yourself - or that you are a  failure or have let yourself or your family down: 0 - not at all  Trouble concentrating on things, such as reading the newspaper or watching television: 3 - nearly every day  Moving or speaking so slowly that other people could have noticed. Or the opposite - being so fidgety or restless that you have been moving around a lot more than usual: 0 - not at all  Thoughts that you would be better off dead, or of hurting yourself in some way: 0 - not at all  PHQ-9 Score: 3  PHQ-9 Interpretation: No or Minimal depression       RUBIO-7 Flowsheet Screening    Flowsheet Row Most Recent Value   Over the last two weeks, how often have you been bothered by the following problems?     Feeling nervous, anxious, or on edge 3   Not being able to stop or control worrying 0   Worrying too much about different things 2   Trouble relaxing  0   Being so restless that it's hard to sit still 3   Becoming easily annoyed or irritable  3   Feeling afraid as if something awful might happen 1   How difficult have these problems made it for you to do your work, take care of things at home, or get along with other people?  Very difficult   RUBIO Score  12          Review of Systems   Constitutional:  Positive for fatigue.   Cardiovascular:  Positive for palpitations.   Musculoskeletal:  Positive for arthralgias.   Psychiatric/Behavioral:  Positive for decreased concentration and sleep disturbance. Negative for dysphoric mood. The patient is nervous/anxious.        Current Outpatient Medications:   •  ALPRAZolam (XANAX) 1 mg tablet, Take 1 tablet (1 mg total) by mouth 2 (two) times a day as needed for anxiety, Disp: 60 tablet, Rfl: 0  •  levothyroxine 150 mcg tablet, Take 250 mcg by mouth daily, Disp: , Rfl:   •  prazosin (MINIPRESS) 2 mg capsule, Take 2 capsules (4 mg total) by mouth daily at bedtime, Disp: 180 capsule, Rfl: 2  •  prochlorperazine (COMPAZINE) 5 mg tablet, PRN, Disp: , Rfl:   •  sertraline (ZOLOFT) 100 mg tablet, Take 2 tablets (200  "mg total) by mouth daily, Disp: 180 tablet, Rfl: 2      Objective   /88 (BP Location: Left arm, Patient Position: Sitting, Cuff Size: Large)   Pulse 68   Resp 17   Ht 5' 6\" (1.676 m)   Wt 73.9 kg (163 lb)   SpO2 98%   BMI 26.31 kg/m²      Physical Exam  Vitals reviewed.   Constitutional:       General: She is not in acute distress.  Cardiovascular:      Rate and Rhythm: Normal rate and regular rhythm.      Pulses: Normal pulses.      Heart sounds: Normal heart sounds.   Pulmonary:      Effort: Pulmonary effort is normal. No respiratory distress.      Breath sounds: Normal breath sounds. No wheezing.   Neurological:      Mental Status: She is alert and oriented to person, place, and time.   Psychiatric:         Mood and Affect: Mood normal.           "
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
## 2025-03-06 NOTE — TELEPHONE ENCOUNTER
Caller: Patient     Doctor: Dr. New    Reason for call: Patient called stated clicking in right ankle is worse and is swollen at the end of the day with pain, requested to speak to nurse. Please advise.    Call back#: [phone]

## 2025-03-07 DIAGNOSIS — G89.29 CHRONIC PAIN OF RIGHT ANKLE: Primary | ICD-10-CM

## 2025-03-07 DIAGNOSIS — M25.571 CHRONIC PAIN OF RIGHT ANKLE: Primary | ICD-10-CM

## 2025-03-07 NOTE — TELEPHONE ENCOUNTER
Caller: Ligia     Doctor: Dr. New     Reason for call: Patient is calling to follow up on questions from yesterday. Please return call.     Call back#: [phone]

## 2025-03-07 NOTE — TELEPHONE ENCOUNTER
Cancelled mri order due to claustrophobia  Ordered CT and cam boot. She can  boot from office.  F/u after CT

## 2025-03-14 ENCOUNTER — HOSPITAL ENCOUNTER (OUTPATIENT)
Dept: RADIOLOGY | Age: 40
Discharge: HOME | End: 2025-03-14
Payer: COMMERCIAL

## 2025-03-14 DIAGNOSIS — M25.571 CHRONIC PAIN OF RIGHT ANKLE: ICD-10-CM

## 2025-03-14 DIAGNOSIS — G89.29 CHRONIC PAIN OF RIGHT ANKLE: ICD-10-CM

## 2025-03-14 PROCEDURE — 73700 CT LOWER EXTREMITY W/O DYE: CPT

## 2025-03-16 ENCOUNTER — NURSE TRIAGE (OUTPATIENT)
Dept: OTHER | Facility: OTHER | Age: 40
End: 2025-03-16

## 2025-03-17 DIAGNOSIS — M95.8 OSTEOCHONDRAL DEFECT OF TALUS: Primary | ICD-10-CM

## 2025-03-17 NOTE — TELEPHONE ENCOUNTER
"FOLLOW UP: Monday morning if further eval needed    REASON FOR CONVERSATION: Ankle Pain    SYMPTOMS: sharp, shooting pain up from ankle to knee, denies calf pain. Some mild swelling of ankle and foot after removing cam boot to assess.     OTHER: pt concerned about increased swelling, advised ice, elevation, Tylenol (can't take ibuprofen). Scheduled for OV Friday for review of CT.     DISPOSITION: Home Care    Reason for Disposition   Ankle pain    Answer Assessment - Initial Assessment Questions  1. ONSET: \"When did the pain start?\"         Has been ongoing, is in a cam boot, sudden sharp shooting pain this evening, no new injury    2. LOCATION: \"Where is the pain located?\"         Right ankle    3. PAIN: \"How bad is the pain?\"  (Scale 1-10; or mild, moderate, severe)        Severe when the shoot pain occurs    4. WORK OR EXERCISE: \"Has there been any recent work or exercise that involved this part of the body?\"         Denies, tried to limit weight bearing as much as possible    5. CAUSE: \"What do you think is causing the ankle pain?\"        unsure    6. OTHER SYMPTOMS: \"Do you have any other symptoms?\" (e.g., calf pain, rash, fever, swelling)        Denies calf pain, swelling    Protocols used: Ankle Pain-Adult-    "

## 2025-03-18 ENCOUNTER — OFFICE VISIT (OUTPATIENT)
Dept: PODIATRY | Facility: CLINIC | Age: 40
End: 2025-03-18
Payer: COMMERCIAL

## 2025-03-18 VITALS — HEIGHT: 66 IN | BODY MASS INDEX: 26.2 KG/M2 | WEIGHT: 163 LBS

## 2025-03-18 DIAGNOSIS — M95.8 OSTEOCHONDRAL DEFECT OF TALUS: ICD-10-CM

## 2025-03-18 DIAGNOSIS — M25.371 ANKLE INSTABILITY, RIGHT: Primary | ICD-10-CM

## 2025-03-18 PROCEDURE — 99243 OFF/OP CNSLTJ NEW/EST LOW 30: CPT | Performed by: PODIATRIST

## 2025-03-18 RX ORDER — GABAPENTIN 100 MG/1
100 CAPSULE ORAL
Qty: 30 CAPSULE | Refills: 2 | Status: SHIPPED | OUTPATIENT
Start: 2025-03-18 | End: 2025-04-17

## 2025-03-18 NOTE — PROGRESS NOTES
"Name: Ligia Abraham      : 1985      MRN: [id number]  Encounter Provider: Sebastian Zamorano DPM  Encounter Date: 3/18/2025   Enc[facility]  :  Assessment & Plan  Osteochondral defect of talus  CT personally reviewed. She has a large lateral talar dome OCD lesion without collapse. She has been in a CAM boot. I reviewed her visit with Sports Medicine  Orders:  •  Ambulatory Referral to Podiatry  •  gabapentin (Neurontin) 100 mg capsule; Take 1 capsule (100 mg total) by mouth daily at bedtime  •  Ambulatory Referral to Orthopedic Surgery; Future    Ankle instability, right  Clinically patient has chronic ankle instability and a subsequent large OCD lesion of her talus. Given the talar cartilage damage, I am recommending she consult with our orthopedic foot and ankle surgeon, Dr. Lachman.       Orders:  •  gabapentin (Neurontin) 100 mg capsule; Take 1 capsule (100 mg total) by mouth daily at bedtime  •  Ambulatory Referral to Orthopedic Surgery; Future        History of Present Illness   HPI  Ligia Abraham is a 39 y.o. female who presents with ankle pain. Two summers ago she sprained her ankle in the back yard. Initially XR was fine, she was sent to PT. Initially she saw improvement and was discharged. This January she began having a lot of pain in the ankle again. She got new XR and a CT and was referred here. The boot reduces pain but it is difficult to walk in.       Review of Systems  As stated in HPI, otherwise normal    Medical History Reviewed by provider this encounter:  Tobacco  Allergies  Meds  Problems  Med Hx  Surg Hx  Fam Hx           Objective   Ht 5' 6\" (1.676 m)   Wt 73.9 kg (163 lb)   BMI 26.31 kg/m²      Physical Exam  Vitals reviewed.   Cardiovascular:      Pulses: Normal pulses.   Musculoskeletal:      Right ankle: Swelling present. No deformity, ecchymosis or lacerations. Tenderness present over the lateral malleolus, ATF ligament and CF " ligament. No medial malleolus, AITF ligament, base of 5th metatarsal or proximal fibula tenderness. Decreased range of motion. Anterior drawer test positive. Normal pulse.      Right Achilles Tendon: No tenderness or defects. Sellers's test negative.   Skin:     Capillary Refill: Capillary refill takes less than 2 seconds.   Neurological:      Mental Status: She is alert.

## 2025-03-19 ENCOUNTER — OFFICE VISIT (OUTPATIENT)
Dept: OBGYN CLINIC | Facility: CLINIC | Age: 40
End: 2025-03-19
Payer: COMMERCIAL

## 2025-03-19 VITALS — BODY MASS INDEX: 26.2 KG/M2 | WEIGHT: 163 LBS | HEIGHT: 66 IN

## 2025-03-19 DIAGNOSIS — M94.9 OSTEOCHONDRAL LESION OF TALAR DOME: ICD-10-CM

## 2025-03-19 DIAGNOSIS — M89.9 OSTEOCHONDRAL LESION OF TALAR DOME: ICD-10-CM

## 2025-03-19 DIAGNOSIS — M25.371 RIGHT ANKLE INSTABILITY: Primary | ICD-10-CM

## 2025-03-19 PROCEDURE — 99214 OFFICE O/P EST MOD 30 MIN: CPT | Performed by: ORTHOPAEDIC SURGERY

## 2025-03-19 PROCEDURE — 20605 DRAIN/INJ JOINT/BURSA W/O US: CPT | Performed by: ORTHOPAEDIC SURGERY

## 2025-03-19 NOTE — PATIENT INSTRUCTIONS
During your appointment today, we injected your right ankle with a pain medication and steroids.  It is common to immediately feel pain relief after the injection.  This is temporary as the pain medication in the injection can wear off in 4-8 hours.  Often, the pain returns later in the day and this is normal.  The steroid can take 24-48 hours to kick in.    Common complaints;  1. Pain at the injection site (like a bee sting)- use ice for 20 minutes at a time, taking 40 minutes break between ice sessions, to alleviate this discomfort  2. Skin discoloration- this is a common side-effect of the steroid and can be a permanent skin color change.  3. Blood sugar elevation- occasionally, steroid injections can effect blood sugar in diabetic patients.  We recommend monitoring your blood sugar over the next 3 days if you are diabetic.    Keep note of how the injection works for you and how much relief you get.   Discontinue boot and transition to supportive sneaker    If the injection works, even if only for 20 minutes, it means that we have isolated the pain generator (i.e. The injection was diagnositic). The hope is that the injection provides prolonged pain relief (i.e. The injection was therapeutic).  If you get better but the pain returns, it is a good sign that a surgery to correct your problem is very likely to eliminate the pain permanently.

## 2025-03-19 NOTE — PROGRESS NOTES
James R Lachman, M.D.  Attending, Orthopaedic Surgery  Foot and Ankle  [facility]    ORTHOPAEDIC FOOT AND ANKLE CLINIC VISIT     Assessment:     Encounter Diagnoses   Name Primary?    Right ankle instability Yes    Osteochondral lesion of talar dome      Plan:   The patient verbalized understanding of exam findings and treatment plan. We engaged in the shared decision-making process and treatment options were discussed at length with the patient. Surgical and conservative management discussed today along with risks and benefits.  Patient with history of right ankle sprain close to 2 yrs ago summer 2023.   Had worsening symptoms of right ankle pain and instability 2 months ago, no new injury. Notes increased activity (started new job as ).  Originally seen by Dr. New, CT scan right lower extremity was performed and demonstrates lateral osteochondral lesion.   Positive anterior drawer on exam as well as tenderness of lateral ankle   Discussed with patient the role of MRI in further evaluation of OCD lesion of talus. Patient states she is unable to undergo MRI without sedation due to history of PTSD. Discussed possible use of benzodiazepines, however patient states this would not be sufficient for her.   As an alternative, a diagnostic injection (lidocaine only)was discussed to help identify if OCD lesion is symptomatic. Patient agreeable to proceed with injection   Discussed with patient that surgery (Brostrom procedure) is indicated to address ankle instability. Her response to the ankle injection will help us determine if the OCD lesion will also need to be addressed surgically.   Discontinue cam boot in interim and transition to supportive sneaker.   OTC pain medication, rest, ice, elevation, compression stocking for pain and swelling control  Return in about 1 week (around 3/26/2025).    Medium joint arthrocentesis  Universal Protocol:  procedure performed by  consultantConsent: Verbal consent obtained.  Risks and benefits: risks, benefits and alternatives were discussed  Consent given by: patient  Patient understanding: patient states understanding of the procedure being performed  Patient consent: the patient's understanding of the procedure matches consent given  Site marked: the operative site was marked  Radiology Images displayed and confirmed. If images not available, report reviewed: imaging studies available  Required items: required blood products, implants, devices, and special equipment available  Patient identity confirmed: verbally with patient  Supporting Documentation  Indications: diagnostic evaluation and pain   Procedure Details  Location: ankle -   Preparation: Patient was prepped and draped in the usual sterile fashion  Needle size: 22 G  Ultrasound guidance: no  Approach: anteromedial    Patient tolerance: patient tolerated the procedure well with no immediate complications  Dressing:  Sterile dressing applied        History of Present Illness:   Chief Complaint:   Chief Complaint   Patient presents with    Right Ankle - Pain     Was ref by pod. Yesterday. Wants to schedule surgery. In cam boot and walking.      Ligia Abraham is a 39 y.o. female who is being seen for right ankle. She sustained a right ankle sprain about 2 yrs ago Summer 2023, tripped in backyard. She had done PT and symptoms improved. She notes she had mild residual swelling and minimal ankle instability since the injury. She started a new job as a  in January this year and noted worsening pain and instability since. No new injury but states her activity increased with the new job.  Pain is localized at lateral ankle with minimal radiating and described as sharp and severe. Patient reports occasional tingling and hypersensitivity of her right foot/ankle; denies numbness, or radicular pain.  Denies history of neuropathy.  Patient does not smoke, does not have diabetes and does  not take blood thinners.  Patient denies family history of anesthesia complications and has not had any complications with anesthesia.     Pain/symptom timing:  Worse during the day when active  Pain/symptom context:  Worse with activites and work  Pain/symptom modifying factors:  Rest makes better, activities make worse  Pain/symptom associated signs/symptoms: none    Prior treatment   NSAIDsNo    Injections Yes   Bracing/Orthotics Yes   Physical Therapy Yes     Orthopedic Surgical History:   See below     Past Medical, Surgical and Social History:  Past Medical History:  has a past medical history of Abdominal pain (11/20/2022), Acid reflux, Anxiety, Atypical mole syndrome, Borderline personality disorder (HCC), Cancer ([facility]), Cervical shortening suspected but not found, Change in bowel habits, Change in mental status, Chronic cholecystitis, Concussion wth loss of consciousness of 30 minutes or less (02/28/2022), COVID-19 (01/2022), Depression, Disease of thyroid gland, Dyspepsia, Follicular thyroid cancer ([facility]), Functional murmur (9/1/2015), Gastric ulcer, GERD (gastroesophageal reflux disease), Hallucination, Heart palpitations (7/12/2017), Hiatal hernia, Hypothyroid, Intractable abdominal pain (11/4/2022), Irritable bowel syndrome, LLQ pain (3/7/2022), Migraine, Multiple neurological symptoms (5/21/2021), Psychiatric illness, PTSD (post-traumatic stress disorder), Rash of hands, Seizure-like activity ([facility]) (7/12/2017), Seizures ([facility]), Self-injurious behavior, Sleep difficulties, Syncope (10/24/2017), Tarry stools (11/20/2022), Thyroid disorder, and Upper respiratory tract infection (7/18/2023).  Problem List: does not have any pertinent problems on file.  Past Surgical History:  has a past surgical history that includes Thyroidectomy (06/2007); Tubal ligation; Nissen fundoplication; pr laps total hysterect 250 gm/< w/rmvl tube/ovary (N/A, 9/7/2016); pr hysteroscopy bx endometrium&/polypc w/wo d&c (N/A,  4/29/2016); pr hysteroscopy endometrial ablation (N/A, 4/29/2016); pr esophagogastroduodenoscopy transoral diagnostic (N/A, 2/24/2016); pr laps abd prtm&omentum dx w/wo spec br/wa spx (N/A, 5/22/2017); Hysterectomy; Appendectomy; Colonoscopy (N/A, 10/30/2017); Abdominal surgery; Esophagogastroduodenoscopy (N/A, 11/13/2017); Dental surgery; Esophagogastric fundoplasty (11/2015); Lymph node biopsy; Hysteroscopy w/ endometrial ablation (04/29/2016); Upper gastrointestinal endoscopy; Laparoscopic cholecystectomy (2021); and FL lumbar puncture diagnostic (7/7/2017).  Family History: family history includes Bipolar disorder in her cousin; Crohn's disease in her maternal grandfather; Depression in her father and mother; Diabetes in her maternal grandmother; Diabetes type II in her maternal grandmother; Hypothyroidism in her maternal grandmother; Other in her paternal grandmother; Skin cancer in her maternal grandmother.  Social History:  reports that she quit smoking about 19 years ago. Her smoking use included cigarettes. She started smoking about 24 years ago. She has a 1.3 pack-year smoking history. She has never used smokeless tobacco. She reports that she does not currently use alcohol. She reports current drug use. Drugs: Benzodiazepines and Marijuana.  Current Medications: has a current medication list which includes the following prescription(s): alprazolam, gabapentin, levothyroxine, prazosin, prochlorperazine, and sertraline.  Allergies: is allergic to depakote [valproic acid], keppra [levetiracetam], prochlorperazine, reglan [metoclopramide], morphine, amoxicillin, and ibuprofen.     Review of Systems:  General- denies fever/chills  HEENT- denies hearing loss or sore throat  Eyes- denies eye pain or visual disturbances, denies red eyes  Respiratory- denies cough or SOB  Cardio- denies chest pain or palpitations  GI- denies abdominal pain  Endocrine- denies urinary frequency  Urinary- denies pain with  "urination  Musculoskeletal- Negative except noted above  Skin- denies rashes or wounds  Neurological- denies dizziness or headache  Psychiatric- denies anxiety or difficulty concentrating    Physical Exam:   Ht 5' 6\" (1.676 m)   Wt 73.9 kg (163 lb)   BMI 26.31 kg/m²   General/Constitutional: No apparent distress: well-nourished and well developed.  Eyes: normal ocular motion  Cardio: RRR, Normal S1S2, No m/r/g  Lymphatic: No appreciable lymphadenopathy  Respiratory: Non-labored breathing, CTA b/l no w/c/r  Vascular: No edema, swelling or tenderness, except as noted in detailed exam.  Integumentary: No impressive skin lesions present, except as noted in detailed exam.  Neuro: No ataxia or tremors noted  Psych: Normal mood and affect, oriented to person, place and time. Appropriate affect.  Musculoskeletal: Normal, except as noted in detailed exam and in HPI.    Examination    Right    Gait Antalgic in cam boot    Musculoskeletal Tender to palpation at lateral ankle     Skin Normal.      Nails Normal    Range of Motion  20 degrees dorsiflexion, 30 degrees plantarflexion  Subtalar motion: normal    Stability Stable    Muscle Strength 3/5 tibialis anterior  3/5 gastrocnemius-soleus  2+/5 posterior tibialis  2+/5 peroneal/eversion strength  5/5 EHL  5/5 FHL    Neurologic Normal    Sensation  Intact to light touch throughout sural, saphenous, superficial peroneal, deep peroneal and medial/lateral plantar nerve distributions.  [place]-Yasemin 5.07 filament (10g) testing  deferred.    Cardiovascular Brisk capillary refill < 2 seconds,intact DP and PT pulses    Special Tests + anterior drawer       Imaging Studies:   CT right lower extremity available for review and demonstrates OCD lesion of lateral talar dome.     Scribe Attestation      I,:  Louise Person PA-C am acting as a scribe while in the presence of the attending physician.:       I,:  James R Lachman, MD personally performed the services described in " this documentation    as scribed in my presence.:           James R. Lachman, MD  Foot & Ankle Surgery   Department of Orthopaedic Surgery  [facility]      I personally performed the service.    James R. Lachman, MD

## 2025-03-19 NOTE — LETTER
March 19, 2025     Patient: Ligia Abraham  YOB: 1985  Date of Visit: 3/19/2025      To Whom it May Concern:    Ligia Abraham is under my professional care. Ligia was seen in my office on 3/19/2025. Ligia will remain out of work at this time. Ligia will require surgery on her right ankle in the near future in the next few weeks. It typically takes jobs about 4.5 months to recover from this surgery and return to a physically demanding job.    If you have any questions or concerns, please don't hesitate to call.         Sincerely,          James R Lachman, MD        CC: No Recipients

## 2025-03-25 ENCOUNTER — OFFICE VISIT (OUTPATIENT)
Dept: OBGYN CLINIC | Facility: CLINIC | Age: 40
End: 2025-03-25
Payer: COMMERCIAL

## 2025-03-25 VITALS — BODY MASS INDEX: 26.2 KG/M2 | WEIGHT: 163 LBS | HEIGHT: 66 IN

## 2025-03-25 DIAGNOSIS — M95.8 OSTEOCHONDRAL DEFECT OF TALUS: ICD-10-CM

## 2025-03-25 DIAGNOSIS — M25.371 ANKLE INSTABILITY, RIGHT: Primary | ICD-10-CM

## 2025-03-25 DIAGNOSIS — M94.9 OSTEOCHONDRAL LESION OF TALAR DOME: ICD-10-CM

## 2025-03-25 DIAGNOSIS — M89.9 OSTEOCHONDRAL LESION OF TALAR DOME: ICD-10-CM

## 2025-03-25 PROCEDURE — 99214 OFFICE O/P EST MOD 30 MIN: CPT | Performed by: ORTHOPAEDIC SURGERY

## 2025-03-25 RX ORDER — CHLORHEXIDINE GLUCONATE 40 MG/ML
SOLUTION TOPICAL DAILY PRN
OUTPATIENT
Start: 2025-03-25

## 2025-03-25 RX ORDER — CHLORHEXIDINE GLUCONATE ORAL RINSE 1.2 MG/ML
15 SOLUTION DENTAL ONCE
OUTPATIENT
Start: 2025-03-25 | End: 2025-03-25

## 2025-03-25 NOTE — H&P (VIEW-ONLY)
James R Lachman, M.D.  Attending, Orthopaedic Surgery  Foot and Ankle  [facility]      ORTHOPAEDIC FOOT AND ANKLE CLINIC VISIT     Assessment and Plan     Assessment & Plan  Ankle instability, right  Patient with h/o right ankle sprain almost 2 yrs ago Summer 2023  Had worsening symptoms of right ankle sprain and instability 2 months ago January 2025  + anterior drawer on exam   Will plan for surgical repair with modified brostrom repair and ankle arthroscopy with debridement, microfracture, and biocartilage procedure for lateral talar OCD lesion at earliest mutually convenient time. Consent signed in clinic today  Orders:    Ambulatory Referral to Orthopedic Surgery    Osteochondral defect of talus  CT right lower extremity demonstrates OCD lesion of lateral talar dome  She is unable to undergo MRI to further evaluate OCD lesion   Patient underwent lidocaine-only injection 3/19/2025- states she had relief for about 6 hrs following injection   Will plan for surgery as above.  Orders:    Ambulatory Referral to Orthopedic Surgery        History of Present Illness:   Chief Complaint:   Chief Complaint   Patient presents with    Follow-up     Follow up of the right ankle. Wants to about surgery.      Ligia Abraham is a 39 y.o. female who is being seen in follow-up for Right ankle. When we last saw she we recommended lidocaine only injection to assess pain associated with OCD lesion. She was also advised to discontinue cam boot.  Pain has minimally improved. She did notice relief from pain following injection for about 6 hrs. Residual pain is localized at lateral ankle with minimal radiating and described as sharp and severe. She is interested in surgery.      Pain/symptom timing:  Worse during the day when active  Pain/symptom context:  Worse with activites and work  Pain/symptom modifying factors:  Rest makes better, activities make worse  Pain/symptom associated signs/symptoms:  none    Prior treatment   NSAIDsNo   Injections Yes   Bracing/Orthotics Yes    Physical Therapy Yes     Orthopedic Surgical History:   See below     Past Medical, Surgical and Social History:  Past Medical History:  has a past medical history of Abdominal pain (11/20/2022), Acid reflux, Anxiety, Atypical mole syndrome, Borderline personality disorder (HCC), Cancer (HCC), Cervical shortening suspected but not found, Change in bowel habits, Change in mental status, Chronic cholecystitis, Concussion wth loss of consciousness of 30 minutes or less (02/28/2022), COVID-19 (01/2022), Depression, Disease of thyroid gland, Dyspepsia, Follicular thyroid cancer (HCC), Functional murmur (9/1/2015), Gastric ulcer, GERD (gastroesophageal reflux disease), Hallucination, Heart palpitations (7/12/2017), Hiatal hernia, Hypothyroid, Intractable abdominal pain (11/4/2022), Irritable bowel syndrome, LLQ pain (3/7/2022), Migraine, Multiple neurological symptoms (5/21/2021), Psychiatric illness, PTSD (post-traumatic stress disorder), Rash of hands, Seizure-like activity (HCC) (7/12/2017), Seizures ([facility]), Self-injurious behavior, Sleep difficulties, Syncope (10/24/2017), Tarry stools (11/20/2022), Thyroid disorder, and Upper respiratory tract infection (7/18/2023).  Problem List: does not have any pertinent problems on file.  Past Surgical History:  has a past surgical history that includes Thyroidectomy (06/2007); Tubal ligation; Nissen fundoplication; pr laps total hysterect 250 gm/< w/rmvl tube/ovary (N/A, 9/7/2016); pr hysteroscopy bx endometrium&/polypc w/wo d&c (N/A, 4/29/2016); pr hysteroscopy endometrial ablation (N/A, 4/29/2016); pr esophagogastroduodenoscopy transoral diagnostic (N/A, 2/24/2016); pr laps abd prtm&omentum dx w/wo spec br/wa spx (N/A, 5/22/2017); Hysterectomy; Appendectomy; Colonoscopy (N/A, 10/30/2017); Abdominal surgery; Esophagogastroduodenoscopy (N/A, 11/13/2017); Dental surgery; Esophagogastric fundoplasty  "(11/2015); Lymph node biopsy; Hysteroscopy w/ endometrial ablation (04/29/2016); Upper gastrointestinal endoscopy; Laparoscopic cholecystectomy (2021); and FL lumbar puncture diagnostic (7/7/2017).  Family History: family history includes Bipolar disorder in her cousin; Crohn's disease in her maternal grandfather; Depression in her father and mother; Diabetes in her maternal grandmother; Diabetes type II in her maternal grandmother; Hypothyroidism in her maternal grandmother; Other in her paternal grandmother; Skin cancer in her maternal grandmother.  Social History:  reports that she quit smoking about 19 years ago. Her smoking use included cigarettes. She started smoking about 24 years ago. She has a 1.3 pack-year smoking history. She has never used smokeless tobacco. She reports that she does not currently use alcohol. She reports current drug use. Drugs: Benzodiazepines and Marijuana.  Current Medications: has a current medication list which includes the following prescription(s): alprazolam, gabapentin, levothyroxine, prazosin, prochlorperazine, and sertraline.  Allergies: is allergic to depakote [valproic acid], keppra [levetiracetam], prochlorperazine, reglan [metoclopramide], morphine, amoxicillin, and ibuprofen.     Review of Systems:  General- denies fever/chills  HEENT- denies hearing loss or sore throat  Eyes- denies eye pain or visual disturbances, denies red eyes  Respiratory- denies cough or SOB  Cardio- denies chest pain or palpitations  GI- denies abdominal pain  Endocrine- denies urinary frequency  Urinary- denies pain with urination  Musculoskeletal- Negative except noted above  Skin- denies rashes or wounds  Neurological- denies dizziness or headache  Psychiatric- denies anxiety or difficulty concentrating    Physical Exam:   Ht 5' 6\" (1.676 m)   Wt 73.9 kg (163 lb)   BMI 26.31 kg/m²   General/Constitutional: No apparent distress: well-nourished and well developed.  Eyes: normal ocular " motion  Lymphatic: No appreciable lymphadenopathy  Respiratory: Non-labored breathing  Vascular: No edema, swelling or tenderness, except as noted in detailed exam.  Integumentary: No impressive skin lesions present, except as noted in detailed exam.  Neuro: No ataxia or tremors noted  Psych: Normal mood and affect, oriented to person, place and time. Appropriate affect.  Musculoskeletal: Normal, except as noted in detailed exam and in HPI.    Examination    Right    Gait Antalgic   Musculoskeletal Tender to palpation at lateral ankle    Skin Normal.      Nails Normal    Range of Motion  20 degrees dorsiflexion, 30 degrees plantarflexion  Subtalar motion: normal    Stability Stable    Muscle Strength 3/5 tibialis anterior  3/5 gastrocnemius-soleus  2+/5 posterior tibialis  2+/5 peroneal/eversion strength  5/5 EHL  5/5 FHL    Neurologic Normal    Sensation  Intact to light touch throughout sural, saphenous, superficial peroneal, deep peroneal and medial/lateral plantar nerve distributions.  [place]-Yasemin 5.07 filament (10g) testing  deferred.    Cardiovascular Brisk capillary refill < 2 seconds,intact DP and PT pulses    Special Tests None      Imaging Studies:   No new imaging    Scribe Attestation      I,:  Louise Person PA-C am acting as a scribe while in the presence of the attending physician.:       I,:  James R Lachman, MD personally performed the services described in this documentation    as scribed in my presence.:             James R. Lachman, MD  Foot & Ankle Surgery   Department of Orthopaedic Surgery  [facility]      I personally performed the service.    James R. Lachman, MD

## 2025-03-25 NOTE — ASSESSMENT & PLAN NOTE
CT right lower extremity demonstrates OCD lesion of lateral talar dome  She is unable to undergo MRI to further evaluate OCD lesion   Patient underwent lidocaine-only injection 3/19/2025- states she had relief for about 6 hrs following injection   Will plan for surgery as above.  Orders:    Ambulatory Referral to Orthopedic Surgery

## 2025-03-25 NOTE — PROGRESS NOTES
James R Lachman, M.D.  Attending, Orthopaedic Surgery  Foot and Ankle  [facility]      ORTHOPAEDIC FOOT AND ANKLE CLINIC VISIT     Assessment and Plan     Assessment & Plan  Ankle instability, right  Patient with h/o right ankle sprain almost 2 yrs ago Summer 2023  Had worsening symptoms of right ankle sprain and instability 2 months ago January 2025  + anterior drawer on exam   Will plan for surgical repair with modified brostrom repair and ankle arthroscopy with debridement, microfracture, and biocartilage procedure for lateral talar OCD lesion at earliest mutually convenient time. Consent signed in clinic today  Orders:    Ambulatory Referral to Orthopedic Surgery    Osteochondral defect of talus  CT right lower extremity demonstrates OCD lesion of lateral talar dome  She is unable to undergo MRI to further evaluate OCD lesion   Patient underwent lidocaine-only injection 3/19/2025- states she had relief for about 6 hrs following injection   Will plan for surgery as above.  Orders:    Ambulatory Referral to Orthopedic Surgery        History of Present Illness:   Chief Complaint:   Chief Complaint   Patient presents with    Follow-up     Follow up of the right ankle. Wants to about surgery.      Ligia Abraham is a 39 y.o. female who is being seen in follow-up for Right ankle. When we last saw she we recommended lidocaine only injection to assess pain associated with OCD lesion. She was also advised to discontinue cam boot.  Pain has minimally improved. She did notice relief from pain following injection for about 6 hrs. Residual pain is localized at lateral ankle with minimal radiating and described as sharp and severe. She is interested in surgery.      Pain/symptom timing:  Worse during the day when active  Pain/symptom context:  Worse with activites and work  Pain/symptom modifying factors:  Rest makes better, activities make worse  Pain/symptom associated signs/symptoms:  none    Prior treatment   NSAIDsNo   Injections Yes   Bracing/Orthotics Yes    Physical Therapy Yes     Orthopedic Surgical History:   See below     Past Medical, Surgical and Social History:  Past Medical History:  has a past medical history of Abdominal pain (11/20/2022), Acid reflux, Anxiety, Atypical mole syndrome, Borderline personality disorder (HCC), Cancer (HCC), Cervical shortening suspected but not found, Change in bowel habits, Change in mental status, Chronic cholecystitis, Concussion wth loss of consciousness of 30 minutes or less (02/28/2022), COVID-19 (01/2022), Depression, Disease of thyroid gland, Dyspepsia, Follicular thyroid cancer (HCC), Functional murmur (9/1/2015), Gastric ulcer, GERD (gastroesophageal reflux disease), Hallucination, Heart palpitations (7/12/2017), Hiatal hernia, Hypothyroid, Intractable abdominal pain (11/4/2022), Irritable bowel syndrome, LLQ pain (3/7/2022), Migraine, Multiple neurological symptoms (5/21/2021), Psychiatric illness, PTSD (post-traumatic stress disorder), Rash of hands, Seizure-like activity (HCC) (7/12/2017), Seizures ([facility]), Self-injurious behavior, Sleep difficulties, Syncope (10/24/2017), Tarry stools (11/20/2022), Thyroid disorder, and Upper respiratory tract infection (7/18/2023).  Problem List: does not have any pertinent problems on file.  Past Surgical History:  has a past surgical history that includes Thyroidectomy (06/2007); Tubal ligation; Nissen fundoplication; pr laps total hysterect 250 gm/< w/rmvl tube/ovary (N/A, 9/7/2016); pr hysteroscopy bx endometrium&/polypc w/wo d&c (N/A, 4/29/2016); pr hysteroscopy endometrial ablation (N/A, 4/29/2016); pr esophagogastroduodenoscopy transoral diagnostic (N/A, 2/24/2016); pr laps abd prtm&omentum dx w/wo spec br/wa spx (N/A, 5/22/2017); Hysterectomy; Appendectomy; Colonoscopy (N/A, 10/30/2017); Abdominal surgery; Esophagogastroduodenoscopy (N/A, 11/13/2017); Dental surgery; Esophagogastric fundoplasty  "(11/2015); Lymph node biopsy; Hysteroscopy w/ endometrial ablation (04/29/2016); Upper gastrointestinal endoscopy; Laparoscopic cholecystectomy (2021); and FL lumbar puncture diagnostic (7/7/2017).  Family History: family history includes Bipolar disorder in her cousin; Crohn's disease in her maternal grandfather; Depression in her father and mother; Diabetes in her maternal grandmother; Diabetes type II in her maternal grandmother; Hypothyroidism in her maternal grandmother; Other in her paternal grandmother; Skin cancer in her maternal grandmother.  Social History:  reports that she quit smoking about 19 years ago. Her smoking use included cigarettes. She started smoking about 24 years ago. She has a 1.3 pack-year smoking history. She has never used smokeless tobacco. She reports that she does not currently use alcohol. She reports current drug use. Drugs: Benzodiazepines and Marijuana.  Current Medications: has a current medication list which includes the following prescription(s): alprazolam, gabapentin, levothyroxine, prazosin, prochlorperazine, and sertraline.  Allergies: is allergic to depakote [valproic acid], keppra [levetiracetam], prochlorperazine, reglan [metoclopramide], morphine, amoxicillin, and ibuprofen.     Review of Systems:  General- denies fever/chills  HEENT- denies hearing loss or sore throat  Eyes- denies eye pain or visual disturbances, denies red eyes  Respiratory- denies cough or SOB  Cardio- denies chest pain or palpitations  GI- denies abdominal pain  Endocrine- denies urinary frequency  Urinary- denies pain with urination  Musculoskeletal- Negative except noted above  Skin- denies rashes or wounds  Neurological- denies dizziness or headache  Psychiatric- denies anxiety or difficulty concentrating    Physical Exam:   Ht 5' 6\" (1.676 m)   Wt 73.9 kg (163 lb)   BMI 26.31 kg/m²   General/Constitutional: No apparent distress: well-nourished and well developed.  Eyes: normal ocular " motion  Lymphatic: No appreciable lymphadenopathy  Respiratory: Non-labored breathing  Vascular: No edema, swelling or tenderness, except as noted in detailed exam.  Integumentary: No impressive skin lesions present, except as noted in detailed exam.  Neuro: No ataxia or tremors noted  Psych: Normal mood and affect, oriented to person, place and time. Appropriate affect.  Musculoskeletal: Normal, except as noted in detailed exam and in HPI.    Examination    Right    Gait Antalgic   Musculoskeletal Tender to palpation at lateral ankle    Skin Normal.      Nails Normal    Range of Motion  20 degrees dorsiflexion, 30 degrees plantarflexion  Subtalar motion: normal    Stability Stable    Muscle Strength 3/5 tibialis anterior  3/5 gastrocnemius-soleus  2+/5 posterior tibialis  2+/5 peroneal/eversion strength  5/5 EHL  5/5 FHL    Neurologic Normal    Sensation  Intact to light touch throughout sural, saphenous, superficial peroneal, deep peroneal and medial/lateral plantar nerve distributions.  Troy-Yasemin 5.07 filament (10g) testing  deferred.    Cardiovascular Brisk capillary refill < 2 seconds,intact DP and PT pulses    Special Tests None      Imaging Studies:   No new imaging    Scribe Attestation      I,:  Louise Person PA-C am acting as a scribe while in the presence of the attending physician.:       I,:  James R Lachman, MD personally performed the services described in this documentation    as scribed in my presence.:             James R. Lachman, MD  Foot & Ankle Surgery   Department of Orthopaedic Surgery  [facility]      I personally performed the service.    James R. Lachman, MD

## 2025-03-25 NOTE — ASSESSMENT & PLAN NOTE
Patient with h/o right ankle sprain almost 2 yrs ago Summer 2023  Had worsening symptoms of right ankle sprain and instability 2 months ago January 2025  + anterior drawer on exam   Will plan for surgical repair with modified brostrom repair and ankle arthroscopy with debridement, microfracture, and biocartilage procedure for lateral talar OCD lesion at earliest mutually convenient time. Consent signed in clinic today  Orders:    Ambulatory Referral to Orthopedic Surgery

## 2025-03-27 ENCOUNTER — ANESTHESIA EVENT (OUTPATIENT)
Dept: PERIOP | Facility: AMBULARY SURGERY CENTER | Age: 40
End: 2025-03-27
Payer: COMMERCIAL

## 2025-03-31 NOTE — PRE-PROCEDURE INSTRUCTIONS
Pre-Surgery Instructions:   Medication Instructions    ALPRAZolam (XANAX) 1 mg tablet Uses PRN- OK to take day of surgery    gabapentin (Neurontin) 100 mg capsule Take night before surgery    levothyroxine 150 mcg tablet Take day of surgery.    prazosin (MINIPRESS) 2 mg capsule Take night before surgery    prochlorperazine (COMPAZINE) 5 mg tablet Uses PRN- OK to take day of surgery    sertraline (ZOLOFT) 100 mg tablet Take night before surgery   Medication instructions for day of surgery reviewed. Please take all instructed medications with only a sip of water.       You will receive a call one business day prior to surgery with an arrival time and hospital directions. If your surgery is scheduled on a Monday, the hospital will be calling you on the Friday prior to your surgery. If you have not heard from anyone by 8pm, please call the hospital supervisor through the hospital  at [phone]. ([place] [phone] or [place] [phone]).    Do not eat or drink anything after midnight the night before your surgery, including candy, mints, lifesavers, or chewing gum. Do not drink alcohol 24hrs before your surgery. Try not to smoke at least 24hrs before your surgery.       Follow the pre surgery showering instructions as listed in the “My Surgical Experience Booklet” or otherwise provided by your surgeon's office. Do not use a blade to shave the surgical area 1 week before surgery. It is okay to use a clean electric clippers up to 24 hours before surgery. Do not apply any lotions, creams, including makeup, cologne, deodorant, or perfumes after showering on the day of your surgery. Do not use dry shampoo, hair spray, hair gel, or any type of hair products.     No contact lenses, eye make-up, or artificial eyelashes. Remove nail polish, including gel polish, and any artificial, gel, or acrylic nails if possible. Remove all jewelry including rings and body piercing jewelry.     Wear causal clothing that is  easy to take on and off. Consider your type of surgery.    Keep any valuables, jewelry, piercings at home. Please bring any specially ordered equipment (sling, braces) if indicated.    Arrange for a responsible person to drive you to and from the hospital on the day of your surgery. Please confirm the visitor policy for the day of your procedure when you receive your phone call with an arrival time.     Call the surgeon's office with any new illnesses, exposures, or additional questions prior to surgery.    Please reference your “My Surgical Experience Booklet” for additional information to prepare for your upcoming surgery.     Advised no vitamins or NSAIDs from now until surgery. Tylenol ok if needed.

## 2025-04-02 PROBLEM — Z98.890 PONV (POSTOPERATIVE NAUSEA AND VOMITING): Status: ACTIVE | Noted: 2020-08-24

## 2025-04-02 NOTE — ANESTHESIA PREPROCEDURE EVALUATION
Procedure:  Ankle arthroscopy, debridement, microfracture and biocartilage procedure (Right: Ankle)  Modified Brostrom, Bone marrow harvest from iliac crest. (Right: Foot)    Relevant Problems   ANESTHESIA   (+) PONV (postoperative nausea and vomiting)      CARDIO   (+) Chronic migraine without aura      ENDO   (+) Postoperative hypothyroidism      GI/HEPATIC   (+) GERD (gastroesophageal reflux disease)      /RENAL   (+) Renal calculi      GYN   (+) History of hysterectomy      NEURO/PSYCH   (+) Chronic migraine without aura   (+) Chronic pain of right ankle   (+) Generalized anxiety disorder with panic attacks   (+) MDD (major depressive disorder), recurrent, in full remission (HCC)   (+) Nonepileptic episode (HCC)   (+) Post traumatic stress disorder (PTSD)      Behavioral Health   (+) Borderline personality disorder (HCC)      Oncology   (+) History of thyroid cancer      FEN/Gastrointestinal   (+) Irritable bowel syndrome with diarrhea        Physical Exam    Airway    Mallampati score: II  TM Distance: >3 FB  Neck ROM: full     Dental       Cardiovascular  Rhythm: regular, Rate: normal    Pulmonary   Breath sounds clear to auscultation    Other Findings  post-pubertal.      Anesthesia Plan  ASA Score- 2     Anesthesia Type- general with ASA Monitors.         Additional Monitors:     Airway Plan: LMA.    Comment: GA with LMA; pre-procedure popliteal nerve block and adductor canal block.       Plan Factors-    Chart reviewed.        Patient is not a current smoker.              Induction- intravenous.    Postoperative Plan- Plan for postoperative opioid use.     Perioperative Resuscitation Plan - Level 1 - Full Code.       Informed Consent- Anesthetic plan and risks discussed with patient.  I personally reviewed this patient with the CRNA. Discussed and agreed on the Anesthesia Plan with the CRNA..      NPO Status:  No vitals data found for the desired time range.

## 2025-04-03 ENCOUNTER — ANESTHESIA (OUTPATIENT)
Dept: PERIOP | Facility: AMBULARY SURGERY CENTER | Age: 40
End: 2025-04-03
Payer: COMMERCIAL

## 2025-04-03 ENCOUNTER — HOSPITAL ENCOUNTER (OUTPATIENT)
Facility: AMBULARY SURGERY CENTER | Age: 40
Setting detail: OUTPATIENT SURGERY
Discharge: HOME | End: 2025-04-03
Attending: ORTHOPAEDIC SURGERY | Admitting: ORTHOPAEDIC SURGERY
Payer: COMMERCIAL

## 2025-04-03 VITALS
TEMPERATURE: 97.6 F | OXYGEN SATURATION: 99 % | HEIGHT: 66 IN | BODY MASS INDEX: 24.11 KG/M2 | SYSTOLIC BLOOD PRESSURE: 114 MMHG | WEIGHT: 150 LBS | HEART RATE: 64 BPM | RESPIRATION RATE: 16 BRPM | DIASTOLIC BLOOD PRESSURE: 72 MMHG

## 2025-04-03 DIAGNOSIS — M25.371 ANKLE INSTABILITY, RIGHT: ICD-10-CM

## 2025-04-03 DIAGNOSIS — M95.8 OSTEOCHONDRAL DEFECT OF TALUS: Primary | ICD-10-CM

## 2025-04-03 PROBLEM — Z90.710 HISTORY OF HYSTERECTOMY: Status: ACTIVE | Noted: 2025-04-03

## 2025-04-03 PROCEDURE — 27698 REPAIR OF ANKLE LIGAMENT: CPT | Performed by: ORTHOPAEDIC SURGERY

## 2025-04-03 PROCEDURE — 29892 ARTHRS AID RPR OD LES/TIB FX: CPT | Performed by: PHYSICIAN ASSISTANT

## 2025-04-03 PROCEDURE — 38220 DX BONE MARROW ASPIRATIONS: CPT | Performed by: PHYSICIAN ASSISTANT

## 2025-04-03 PROCEDURE — C1713 ANCHOR/SCREW BN/BN,TIS/BN: HCPCS | Performed by: ORTHOPAEDIC SURGERY

## 2025-04-03 PROCEDURE — 29892 ARTHRS AID RPR OD LES/TIB FX: CPT | Performed by: ORTHOPAEDIC SURGERY

## 2025-04-03 PROCEDURE — 38220 DX BONE MARROW ASPIRATIONS: CPT | Performed by: ORTHOPAEDIC SURGERY

## 2025-04-03 PROCEDURE — 27698 REPAIR OF ANKLE LIGAMENT: CPT | Performed by: PHYSICIAN ASSISTANT

## 2025-04-03 PROCEDURE — C1781 MESH (IMPLANTABLE): HCPCS | Performed by: ORTHOPAEDIC SURGERY

## 2025-04-03 DEVICE — DISPOSABLES KIT, FOR DX FIBERTAK
Type: IMPLANTABLE DEVICE | Site: ANKLE | Status: FUNCTIONAL
Brand: ARTHREX®

## 2025-04-03 DEVICE — GRAFT HUMAN TISSUE CARTILAGE MATRIX 1ML: Type: IMPLANTABLE DEVICE | Site: ANKLE | Status: FUNCTIONAL

## 2025-04-03 DEVICE — DX FIBERTAK SUTURE ANCHOR, ST & NDLS
Type: IMPLANTABLE DEVICE | Site: ANKLE | Status: FUNCTIONAL
Brand: ARTHREX®

## 2025-04-03 RX ORDER — ASPIRIN 81 MG/1
81 TABLET ORAL 2 TIMES DAILY
Qty: 84 TABLET | Refills: 0 | Status: SHIPPED | OUTPATIENT
Start: 2025-04-03 | End: 2025-05-15

## 2025-04-03 RX ORDER — CHLORHEXIDINE GLUCONATE ORAL RINSE 1.2 MG/ML
15 SOLUTION DENTAL ONCE
Status: COMPLETED | OUTPATIENT
Start: 2025-04-03 | End: 2025-04-03

## 2025-04-03 RX ORDER — ONDANSETRON 2 MG/ML
4 INJECTION INTRAMUSCULAR; INTRAVENOUS ONCE AS NEEDED
Status: DISCONTINUED | OUTPATIENT
Start: 2025-04-03 | End: 2025-04-03 | Stop reason: HOSPADM

## 2025-04-03 RX ORDER — FENTANYL CITRATE/PF 50 MCG/ML
25 SYRINGE (ML) INJECTION
Status: DISCONTINUED | OUTPATIENT
Start: 2025-04-03 | End: 2025-04-03 | Stop reason: HOSPADM

## 2025-04-03 RX ORDER — SCOPOLAMINE 1 MG/3D
1 PATCH, EXTENDED RELEASE TRANSDERMAL ONCE
Status: DISCONTINUED | OUTPATIENT
Start: 2025-04-03 | End: 2025-04-03 | Stop reason: HOSPADM

## 2025-04-03 RX ORDER — OXYCODONE HYDROCHLORIDE 5 MG/1
5 TABLET ORAL ONCE AS NEEDED
Status: COMPLETED | OUTPATIENT
Start: 2025-04-03 | End: 2025-04-03

## 2025-04-03 RX ORDER — LIDOCAINE HYDROCHLORIDE 10 MG/ML
INJECTION, SOLUTION EPIDURAL; INFILTRATION; INTRACAUDAL; PERINEURAL AS NEEDED
Status: DISCONTINUED | OUTPATIENT
Start: 2025-04-03 | End: 2025-04-03

## 2025-04-03 RX ORDER — DIPHENHYDRAMINE HYDROCHLORIDE 50 MG/ML
12.5 INJECTION, SOLUTION INTRAMUSCULAR; INTRAVENOUS ONCE AS NEEDED
Status: DISCONTINUED | OUTPATIENT
Start: 2025-04-03 | End: 2025-04-03 | Stop reason: HOSPADM

## 2025-04-03 RX ORDER — FENTANYL CITRATE 50 UG/ML
INJECTION, SOLUTION INTRAMUSCULAR; INTRAVENOUS AS NEEDED
Status: DISCONTINUED | OUTPATIENT
Start: 2025-04-03 | End: 2025-04-03

## 2025-04-03 RX ORDER — BUPIVACAINE HYDROCHLORIDE 5 MG/ML
INJECTION, SOLUTION EPIDURAL; INTRACAUDAL; PERINEURAL
Status: COMPLETED | OUTPATIENT
Start: 2025-04-03 | End: 2025-04-03

## 2025-04-03 RX ORDER — HYDROMORPHONE HCL/PF 1 MG/ML
0.5 SYRINGE (ML) INJECTION
Status: DISCONTINUED | OUTPATIENT
Start: 2025-04-03 | End: 2025-04-03 | Stop reason: HOSPADM

## 2025-04-03 RX ORDER — MIDAZOLAM HYDROCHLORIDE 2 MG/2ML
INJECTION, SOLUTION INTRAMUSCULAR; INTRAVENOUS AS NEEDED
Status: DISCONTINUED | OUTPATIENT
Start: 2025-04-03 | End: 2025-04-03

## 2025-04-03 RX ORDER — PROPOFOL 10 MG/ML
INJECTION, EMULSION INTRAVENOUS AS NEEDED
Status: DISCONTINUED | OUTPATIENT
Start: 2025-04-03 | End: 2025-04-03

## 2025-04-03 RX ORDER — ONDANSETRON 4 MG/1
4 TABLET, FILM COATED ORAL EVERY 8 HOURS PRN
Qty: 10 TABLET | Refills: 0 | Status: SHIPPED | OUTPATIENT
Start: 2025-04-03

## 2025-04-03 RX ORDER — CHLORHEXIDINE GLUCONATE 40 MG/ML
SOLUTION TOPICAL DAILY PRN
Status: DISCONTINUED | OUTPATIENT
Start: 2025-04-03 | End: 2025-04-03 | Stop reason: HOSPADM

## 2025-04-03 RX ORDER — SODIUM CHLORIDE 9 MG/ML
INJECTION, SOLUTION INTRAVENOUS AS NEEDED
Status: DISCONTINUED | OUTPATIENT
Start: 2025-04-03 | End: 2025-04-03 | Stop reason: HOSPADM

## 2025-04-03 RX ORDER — VANCOMYCIN HYDROCHLORIDE 1 G/20ML
INJECTION, POWDER, LYOPHILIZED, FOR SOLUTION INTRAVENOUS AS NEEDED
Status: DISCONTINUED | OUTPATIENT
Start: 2025-04-03 | End: 2025-04-03 | Stop reason: HOSPADM

## 2025-04-03 RX ORDER — CEFAZOLIN SODIUM 2 G/50ML
2000 SOLUTION INTRAVENOUS ONCE
Status: COMPLETED | OUTPATIENT
Start: 2025-04-03 | End: 2025-04-03

## 2025-04-03 RX ORDER — SODIUM CHLORIDE, SODIUM LACTATE, POTASSIUM CHLORIDE, CALCIUM CHLORIDE 600; 310; 30; 20 MG/100ML; MG/100ML; MG/100ML; MG/100ML
75 INJECTION, SOLUTION INTRAVENOUS CONTINUOUS
Status: DISCONTINUED | OUTPATIENT
Start: 2025-04-03 | End: 2025-04-03 | Stop reason: HOSPADM

## 2025-04-03 RX ORDER — OXYCODONE HYDROCHLORIDE 5 MG/1
5 TABLET ORAL EVERY 4 HOURS PRN
Qty: 30 TABLET | Refills: 0 | Status: SHIPPED | OUTPATIENT
Start: 2025-04-03

## 2025-04-03 RX ADMIN — SODIUM CHLORIDE, SODIUM LACTATE, POTASSIUM CHLORIDE, AND CALCIUM CHLORIDE: .6; .31; .03; .02 INJECTION, SOLUTION INTRAVENOUS at 11:57

## 2025-04-03 RX ADMIN — FENTANYL CITRATE 50 MCG: 50 INJECTION INTRAMUSCULAR; INTRAVENOUS at 11:01

## 2025-04-03 RX ADMIN — FENTANYL CITRATE 25 MCG: 50 INJECTION INTRAMUSCULAR; INTRAVENOUS at 13:41

## 2025-04-03 RX ADMIN — MIDAZOLAM 2 MG: 1 INJECTION INTRAMUSCULAR; INTRAVENOUS at 10:57

## 2025-04-03 RX ADMIN — OXYCODONE HYDROCHLORIDE 5 MG: 5 TABLET ORAL at 15:35

## 2025-04-03 RX ADMIN — BUPIVACAINE 15 ML: 13.3 INJECTION, SUSPENSION, LIPOSOMAL INFILTRATION at 11:06

## 2025-04-03 RX ADMIN — BUPIVACAINE HYDROCHLORIDE 10 ML: 5 INJECTION, SOLUTION EPIDURAL; INTRACAUDAL; PERINEURAL at 11:06

## 2025-04-03 RX ADMIN — FENTANYL CITRATE 25 MCG: 50 INJECTION INTRAMUSCULAR; INTRAVENOUS at 13:34

## 2025-04-03 RX ADMIN — PROPOFOL 30 MCG/KG/MIN: 10 INJECTION, EMULSION INTRAVENOUS at 12:07

## 2025-04-03 RX ADMIN — CHLORHEXIDINE GLUCONATE 15 ML: 1.2 SOLUTION ORAL at 10:03

## 2025-04-03 RX ADMIN — FENTANYL CITRATE 25 MCG: 50 INJECTION INTRAMUSCULAR; INTRAVENOUS at 13:53

## 2025-04-03 RX ADMIN — PROPOFOL 200 MG: 10 INJECTION, EMULSION INTRAVENOUS at 12:02

## 2025-04-03 RX ADMIN — HYDROMORPHONE HYDROCHLORIDE 0.5 MG: 1 INJECTION, SOLUTION INTRAMUSCULAR; INTRAVENOUS; SUBCUTANEOUS at 14:17

## 2025-04-03 RX ADMIN — HYDROMORPHONE HYDROCHLORIDE 0.5 MG: 1 INJECTION, SOLUTION INTRAMUSCULAR; INTRAVENOUS; SUBCUTANEOUS at 14:01

## 2025-04-03 RX ADMIN — CEFAZOLIN SODIUM 2000 MG: 2 SOLUTION INTRAVENOUS at 12:05

## 2025-04-03 RX ADMIN — HYDROMORPHONE HYDROCHLORIDE 0.5 MG: 1 INJECTION, SOLUTION INTRAMUSCULAR; INTRAVENOUS; SUBCUTANEOUS at 14:28

## 2025-04-03 RX ADMIN — SCOPOLAMINE 1 PATCH: 1.5 PATCH, EXTENDED RELEASE TRANSDERMAL at 11:54

## 2025-04-03 RX ADMIN — FENTANYL CITRATE 25 MCG: 50 INJECTION INTRAMUSCULAR; INTRAVENOUS at 13:47

## 2025-04-03 RX ADMIN — LIDOCAINE HYDROCHLORIDE 50 MG: 10 INJECTION, SOLUTION EPIDURAL; INFILTRATION; INTRACAUDAL at 12:02

## 2025-04-03 RX ADMIN — BUPIVACAINE HYDROCHLORIDE 20 ML: 5 INJECTION, SOLUTION EPIDURAL; INTRACAUDAL; PERINEURAL at 11:06

## 2025-04-03 RX ADMIN — FENTANYL CITRATE 50 MCG: 50 INJECTION INTRAMUSCULAR; INTRAVENOUS at 10:57

## 2025-04-03 NOTE — DISCHARGE INSTR - AVS FIRST PAGE
James R Lachman, M.D.  Attending, Orthopaedic Surgery  [facility]  [place] Office Phone: [phone] ? Fax: [phone]  [place] Office Phone: [phone] ? Fax:[phone]    : Desire Jefferson MA    Surgery Coordinators [place]: Shamika Phillips, [phone]  Tavia Mal, [phone]  Surgery Coordinator [place]:  Louise Castro, [phone]                                                        Azucena Vásquez, [phone]                                                                                                                      [email or web address]   POST-OPERATIVE INSTRUCTIONS    General Information:  Typical post operative visits are at the following intervals:  2-3 weeks post surgery, 6 weeks post surgery, 3 months post surgery, 6 months post surgery, and then on a yearly basis.  However, this may change based on Dr. Lachmans’ recommendation.  #1 post-operative rule for foot/ankle surgery:  ONCE YOU ARE OUT OF YOUR CAST AND/OR REMOVABLE BOOT, SWELLING MAY PERSIST FOR MANY MONTHS.  YOU MIGHT ALSO EXPERIENCE A BLUISH DISCOLORATION OF YOUR LEG.  THIS IS NORMAL AND PART OF THE USUAL POSTOPERATIVE EXPERIENCE.  DO NOT WAIT UNTIL YOUR BLOCK WEARS OFF TO TAKE YOUR PAIN MEDICATION.  IT TAKES A FEW DOSES OF THE PAIN MEDICATION TO REACH A THERAPEUTIC LEVEL.  TAKE A TABLET PROACTIVELY BEFORE YOU HAVE ANY PAIN AND AGAIN 4 HOURS LATER SO WHEN THE BLOCK WEARS OFF, YOU ARE NOT CAUGHT OFF GUARD.    SMOKING:  Smoking results in incomplete healing of fractures (broken bones) and joints that my have been fused.  Smoking and nicotine also prevents the growth of bone into ankle replacements and bone healing.  It also slows the healing of muscles and skin (soft tissue).  Therefore, please do not have surgery if you continue to smoke.  We reserve the right to cancel your surgery if we suspect that you are smoking.  DO NOT use  nicorette gum or other patches.  Please find an alternative method to quit smoking before your surgery and do not restart after surgery to allow for healing.      THREE RULES:    After surgery you will most likely be given the instructions “KEEP YOUR TOES ABOVE YOUR NOSE.”  This means that you MUST have your feet elevated higher than your heart.  Keeping your toes above your nose helps to heal the muscles and skin (soft tissues) by reducing swelling in your leg.  This position also helps to prevent infection, and is very important in avoiding deep venous thrombosis (blood clots).    In order to keep the blood circulating in your legs and in order to avoid deep vein   thrombosis (blood clots), we ask patients to GET UP ONCE AN HOUR during the day.  This means you should at least cross the room and come back.  It does not mean you have to be up for long periods of time.  In most cases we will not have people immediately put any weight on their operated part.  This is important to prevent loosening of metal or other devices holding the bones together.  It also prevents irritation of the soft tissues which can lead to prolonged healing.  When we say get up once an hour, please walk, hop or move with an assisted device.  This is important!    Do not do any excessive walking during the first few days after surgery.  Recovering from surgery is a full-time task for the patient.  Postoperative care is important to avoid irritating the skin incision, which can lead to infection.  Please do not plan activities or go out of town for several weeks after surgery.        AFTER YOUR SURGERY:  Bleeding through the bandage almost always occurs.  Do not let this alarm you.  Simply overwrap with an ABD pad and Ace bandage (The nurses discharging your from the day of surgery will provide this.)   If you think it is excessive, you can come in early for a dressing change (at around 1 week instead of 3 weeks postop.)    Do not get the  bandage wet.  Showering is possible with plastic protectors.   Be very careful, as the bathroom can be wet and slippery.  If you do get your dressing wet, it should be changed immediately.  Please contact us.      ONCE YOUR ARE OUT OF YOUR CAST AND/OR REMOVABLE BOOT, SWELLING MAY PERSIST FOR MANY MONTHS.  THERE WILL ALSO BE A BLUISH DISCOLORATION OF YOUR LEG FOR MONTHS.  THIS IS NORMAL AND PART OF THE USUAL POSTOPERATIVE EXPERIENCE.  WEARING COMPRESSION HOSE (ELASTIC STOCKINGS) CAN HELP AVOID SOME OF THIS SWELLING.    Ice the area 20 minutes every hour once the nerve block wears off. If you are in a cast or a splint, you may need to leave the ice on longer than 20 minutes in order to feel any benefits.       DRESSING:   The purpose of the surgical dressing is to keep your wound and the surgical site protected from the environment.  Most dressings contain splints, which help to hold your foot and ankle in a corrected position, and also allow the surgical site to heal properly. IF YOU GO TO THE EMERGENCY ROOM FOR ANY REASON, AND THEY REMOVE YOUR DRESSING OR SPLINT, YOU MUST BE SEEN IN DR. LACHMANS CLINIC TO HAVE IT REPLACED) AS SOON AS POSSIBLE (IDEALLY THE NEXT DAY).   If you have a drain in place, this will need to be removed in 1 day after surgery.  The time for the drain to be pulled will be written on your discharge instruction sheet.    CAST  INSTRUCTIONS:  You may or may not get a cast following surgery.  If you do, pay close attention to the following:    After application of a splint or cast, it is very important to elevate your leg for 24 to 72 hours.   The injured area should be elevated well above the heart.   Remember “Toes above your Nose”.  Rest and elevation greatly reduce pain and speed the healing process by minimizing early swelling.    CALL YOUR DOCTORS OFFICE OR VISIT LOCATION EMERGENCY ROOM IF YOU HAVE ANY OF THE FOLLOWING:    Significant increased pain, which may be caused by swelling (Strict  elevation will alleviate this)  Numbness and tingling in your hand or foot, which may be caused by too much pressure on the nerves (There is always numbness after surgery due to nerve blocks)  Burning and stinging, which may be caused by too much pressure on the skin  Excessive swelling below the cast, which may mean the cast is slowing your blood circulation  Loss of active movement of toes, which request an urgent evaluation (if you have had a nerve block, this is an expected thing and totally normal)  Loss of “capillary refill”.  Pinch the tip of toes and epifanio the skin.  Release pressure and if the skin does not return pink then call the office immediately.      DO NOT GET YOUR CAST WET.   Bacteria thrive in moist dark areas.  We do not want this.   If your cast becomes wet, return to the office and we will apply another one.    PAIN AFTER SURGERY:  Narcotic pain medication can and will depress your respiratory system if taken in excess.  The goal of pain management with narcotics is to be comfortable not pain free.  If you take enough narcotics to be pain free then you run the risk of stopping breathing.  If this happens, call 911 immediately!  Pain in the heel is often  caused by pressure from the weight of your foot on the bed.  Make sure your heel is suspended off the bed by keeping a pillow underneath your calf not your knee.    Medications:  You will be given narcotic pain medication. Do NOT drive while taking narcotic medications. Medications such as Darvocet, Percocet, Vicoden or Tylenol #3, also contain acetaminophen (Tylenol). Do not take acetaminophen or Tylenol from home when taking theses medications. When you fill your prescription, you may ask the pharmacist if your pain medication has acetaminophen/Tylenol in it. It is okay to take Tylenol with Oxycontin/Oxycodone.   Unless you are allergic to aspirin or currently taking a blood thinner, Dr. Lachmans’ patients are requested to take one 81 mg  aspirin every 12 hours until you are back to walking normally after surgery (This can be up to 6 weeks). Ecotrin (Enteric-coated aspirin) is more sensitive to the stomach and we recommend purchasing this instead of regular aspirin to minimize the risk of stomach irritation.  Narcotic medications commonly cause nausea. Taking them with food will decrease this side effect. If you are having extreme nausea, please contact us for an alternative medication or for something that can be taken with this medication to decrease the nausea.   Also, narcotic medications frequently cause constipation. An increase of fiber, fruits and vegetables in your diet may alleviate this problem, or if necessary, you may use an over-the-counter medication such as senekot, colace, or Fibercon for constipation problems.   You should resume all medications you were taking prior to the surgery unless otherwise specified.   If you had fracture surgery, bony surgery like an osteotomy or fusion, or a surgery that requires bone healing, you are advised to take Vitamin D and Calcium to improve healing potential.  Vitamin D3 4000 units/day and Calcium 1200mg/day. These are over the counter medications so please pick them up at the pharmacy when you are picking up your prescriptions.    Activity:   Because of your recent foot surgery, your activity level will decrease. You will need to elevate your foot ABOVE the level of your heart for a minimum of four days. The length of time necessary for the swelling to go down, and for your wounds to heal properly depends greatly on your efforts here. Elevation is extremely important to avoid compromising the blood supply to your foot. Remember when your foot is down it will swell, which will increase pain and slow healing. Wiggle your toes frequently if possible.   If you go home with a regional block, (a type of anesthesia) the foot and leg will be numb. Think of ways to get into your house and around the  house until the block wears off.   Keep in mind that it may be a legal issue if you drive while in a cast or splint, especially when the splint is on the right foot. You may call the Department of Motor Vehicles to schedule a road test if you have adaptive equipment applied to your car.   The amount of weight you are allowed to bear on your foot will be written on your discharge sheet filled out at the time of surgery. The following is an explanation of the possibilities:     Non-weight bearing:   You are to put NO weight whatsoever on your foot. When using crutches or a walker, your foot should not touch the ground, except when you are standing. Then, it may rest on the ground. If you are to be non-weight bearing, and you are not compliant, you could compromise the surgery.     Some of our patients have been requesting prescriptions for a roll-a-bout knee scooter. [employer] and other insurances have been denying these claims, and you may either have to rent one or pay out of pocket to purchase one.

## 2025-04-03 NOTE — ANESTHESIA PROCEDURE NOTES
Peripheral Block    Patient location during procedure: holding area  Start time: 4/3/2025 11:06 AM  Reason for block: procedure for pain, at surgeon's request and post-op pain management  Staffing  Performed by: Peng Rivas MD  Authorized by: Peng Rivas MD    Preanesthetic Checklist  Completed: patient identified, IV checked, site marked, risks and benefits discussed, surgical consent, monitors and equipment checked, pre-op evaluation and timeout performed  Peripheral Block  Patient position: supine  Prep: ChloraPrep  Patient monitoring: continuous pulse oximetry and heart rate  Block type: Adductor Canal  Laterality: right  Injection technique: single-shot  Procedures: ultrasound guided, Ultrasound guidance required for the procedure to increase accuracy and safety of medication placement and decrease risk of complications.  Ultrasound permanent image saved  bupivacaine (PF) (MARCAINE) 0.5 % injection 20 mL - Perineural   10 mL - 4/3/2025 11:06:00 AM  bupivacaine liposomal (EXPAREL) 1.3 % injection 20 mL - Perineural   5 mL - 4/3/2025 11:06:00 AM  Needle  Needle type: Stimuplex   Needle gauge: 22 G  Needle length: 4 in  Needle localization: ultrasound guidance  Assessment  Injection assessment: frequent aspiration, incremental injection, needle tip visualized at all times, injected with ease, negative aspiration, negative for heart rate change, no paresthesia on injection and no symptoms of intraneural/intravenous injection  Paresthesia pain: none  Post-procedure:  site cleaned  patient tolerated the procedure well with no immediate complications

## 2025-04-03 NOTE — OP NOTE
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 debridement. microfracture and biocartilage procedure  Right - Modified Brostrom. Bone marrow harvest from iliac crest.    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 Right ankle arthroscopy  2. Forest of bone marrow aspirate from iliac crest  3. Microfracture medial talar dome OCD lesion  4. Biocartilage procedure, medial talar dome OCD lesion  6. BMAC injection into ankle joint.  7. Modified Brostrom procedure  8. Placement into short leg nonweightbearing plaster splint.       A small incision was made over the gluteal pillar of the iliac crest and autologous bone graft elements were obtained for use in the fusion (separate incision, separate field). The 60cc of bone marrow aspirate was passed off the field for concentration.    The operative leg was placed in a well padded leg dhillon for traction.  An esmarch was used to exsanguinate the leg and the tourniquet was inflated to 275mm Hg.  See anesthesia documentation for tourniquet time.  The noninvasive traction was set up.  Attention was turned to the medial portal site.  A needle was placed into the joint just medial to the tibialis anterior tendon.   A small skin only incision was made.  Blunt dissection was made through the capsule.  The 2.7mm arthroscope was introduced.  There was severe synovitis.  Next, the lateral portal was established with a needle looking inside out.  Next, a small skin only incision was made and vertical spreading occurred along the incision.  Blunt dissection was performed through the capsule.  The 2.5mm shaver was introduced.  The synovitis was debrided.       The medial OLT was identified. It was 12mm x 6mm. There was a small loose section of cartilage.  This was debrided.  We microfractured in 2 locations using the microfracture awl.  Bone marrow elements and blood were seen exiting the bone. We then converted to a dry scope, we used QTips to dry out the OCD bed.  Next we used biocartilage in the OCD bed using the Arthrex delivery mechanism.  We smoothed the area out to match the surrounding cartilage.  We then used the fibrin glue to secure this in place.     The arthroscopy equipment was removed.    A curvilinear incision was made on the lateral ankle centered on the fibula.      Attention was turned anteriorly through this incision. Great care was taken not to violate the saphenous vein or the branches of the superficial peroneal nerve or the peroneal retinaculum. Dissection was carried down bluntly and sharply down to the level of the lateral fibula but not deep to the periosteum or the extensor retinaculum. A small periosteal flap was created sharply with a 15-blade along the distal fibula. The peroneal tendons were identified at the posterior aspect of this periosteal flap and protected throughout the length of the case. The ankle joint was entered. A bleeding bone trough along the distal fibula was made with a koko.     The ankle was then placed in valgus and dorsiflexion and no anterior translation. Two Arthrex SutureTak suture anchors were then placed in sequential fashion on the anterior surface of the fibula, being  careful not to violate the ankle joint.The sutures were passed in sequential fashion through the proximal edges of the ATFL and CFL. The ankle was held in slight eversion with neutral location of the talus in the mortise. These were tied down and tensioned to recreate tension of the lateral structures. The previously made periosteal flap was then sutured to the repaired lateral structures creating a pants over vest construct. There appeared to be good reconstruction of the lateral structures with adequate tension. The retinaculum and the periosteal flap were reinforced with Ethibond suture. The foot was held in dorsiflexion for the remainder of the case. The wound was copiously irrigated. The ankle was again tested and there was no longer translation with anterior drawer.       All incisions were closed with 3-0 nylon.  A well padded splint was placed.  All toes were pink and warm at the end of the case. We injected the remaining BMAC into the ankle joint after the wounds were closed.     I was present for the entire procedure., A qualified resident physician was not available., and A physician assistant was required during the procedure for retraction, tissue handling, dissection and suturing.    Patient Disposition:  PACU              SIGNATURE: James R Lachman, MD  DATE: April 3, 2025  TIME: 9:57 AM

## 2025-04-03 NOTE — ANESTHESIA POSTPROCEDURE EVALUATION
Post-Op Assessment Note    CV Status:  Stable  Pain Score: 3    Pain management: adequate       Hydration Status:  Stable   PONV Controlled:  None   Airway Patency:  Patent     Post Op Vitals Reviewed: Yes    No anethesia notable event occurred.    Staff: Anesthesiologist           Last Filed PACU Vitals:  Vitals Value Taken Time   Temp 97 °F (36.1 °C) 04/03/25 1400   Pulse 59 04/03/25 1436   /85 04/03/25 1430   Resp 21 04/03/25 1436   SpO2 99 % 04/03/25 1436   Vitals shown include unfiled device data.    Modified Roxanna:     Vitals Value Taken Time   Activity 2 04/03/25 1321   Respiration 2 04/03/25 1321   Circulation 2 04/03/25 1321   Consciousness 1 04/03/25 1321   Oxygen Saturation 2 04/03/25 1321     Modified Roxanna Score: 9

## 2025-04-03 NOTE — INTERVAL H&P NOTE
H&P reviewed. After examining the patient I find no changes in the patients condition since the H&P had been written.    There were no vitals filed for this visit.    Plan for right Brostrom, Ankle arthroscopy, Talar OCD microfracture and biocartilage procedure.

## 2025-04-03 NOTE — ANESTHESIA PROCEDURE NOTES
Peripheral Block    Patient location during procedure: holding area  Start time: 4/3/2025 11:06 AM  Reason for block: procedure for pain, at surgeon's request and post-op pain management  Staffing  Performed by: Peng Rivas MD  Authorized by: Peng Rivas MD    Preanesthetic Checklist  Completed: patient identified, IV checked, site marked, risks and benefits discussed, surgical consent, monitors and equipment checked, pre-op evaluation and timeout performed  Peripheral Block  Patient position: left lateral  Prep: ChloraPrep  Patient monitoring: continuous pulse oximetry and heart rate  Block type: Popliteal  Laterality: right  Injection technique: single-shot  Procedures: ultrasound guided, Ultrasound guidance required for the procedure to increase accuracy and safety of medication placement and decrease risk of complications.  Ultrasound permanent image saved  bupivacaine (PF) (MARCAINE) 0.5 % injection 20 mL - Perineural   20 mL - 4/3/2025 11:06:00 AM  bupivacaine liposomal (EXPAREL) 1.3 % injection 20 mL - Perineural   15 mL - 4/3/2025 11:06:00 AM  Needle  Needle type: Stimuplex   Needle gauge: 22 G  Needle length: 4 in  Needle localization: ultrasound guidance  Assessment  Injection assessment: frequent aspiration, incremental injection, injected with ease, needle tip visualized at all times, negative aspiration, negative for heart rate change, no paresthesia on injection and no symptoms of intraneural/intravenous injection  Paresthesia pain: none  Post-procedure:  site cleaned  patient tolerated the procedure well with no immediate complications

## 2025-04-03 NOTE — ANESTHESIA POSTPROCEDURE EVALUATION
Post-Op Assessment Note    CV Status:  Stable    Pain management: adequate       Mental Status:  Awake and sleepy   Hydration Status:  Euvolemic   PONV Controlled:  Controlled   Airway Patency:  Patent     Post Op Vitals Reviewed: Yes    No anethesia notable event occurred.    Staff: CRNA           Last Filed PACU Vitals:  Vitals Value Taken Time   Temp     Pulse     BP     Resp     SpO2

## 2025-04-11 ENCOUNTER — TELEPHONE (OUTPATIENT)
Dept: OBGYN CLINIC | Facility: HOSPITAL | Age: 40
End: 2025-04-11

## 2025-04-11 DIAGNOSIS — M94.9 OSTEOCHONDRAL LESION OF TALAR DOME: Primary | ICD-10-CM

## 2025-04-11 DIAGNOSIS — M89.9 OSTEOCHONDRAL LESION OF TALAR DOME: Primary | ICD-10-CM

## 2025-04-11 RX ORDER — OXYCODONE HYDROCHLORIDE 5 MG/1
5 TABLET ORAL EVERY 4 HOURS PRN
Qty: 10 TABLET | Refills: 0 | Status: SHIPPED | OUTPATIENT
Start: 2025-04-11

## 2025-04-11 NOTE — TELEPHONE ENCOUNTER
Called and spoke w/pt and scheduled her for 4/15/25 at [place] at 1:45. States if anything opens up earlier, please call her. Relayed Dr Lachman's msg that Tuesday's appt is fine and provided reassurance to pt that if Dr Lachman thought she needed to be seen sooner we would've arranged it. Reassured pt that Dr Lachman only wants the best possible outcomes for his patients. I did advised pt that if she had any worsening symptoms, to please call us back at Ortho for advice as he prefers his not to go to ED w/out being advised.  She states she appreciates knowing that ortho is available to be called. No further questions or concerns.

## 2025-04-11 NOTE — TELEPHONE ENCOUNTER
Received Epic Secure Chat msg from Dr Lachman: If she didn't break her splint, she didn't compromise the surgery. Elevation and ice, rest, this will calm down     Called and spoke w/pt and relayed Dr Lachman's msg. She would like a refill of the oxycodone as pain is severe. The reason she is out of oxy is that she had pain in hip from bone and she needed to take 2 oxy at a time as allowed.Can hear in pt's voice that she is in pain.  Please send to pharmacy on file. Thank you. CB if need anything else for symptoms worsen.     Pharmacy: [facility] in [place] on file.

## 2025-04-11 NOTE — TELEPHONE ENCOUNTER
"Called and spoke w/pt and she had on 4/23/25  Ankle arthroscopy, debridement, microfracture and biocartilage procedure (Right: Ankle)   Modified Cathy, Bone marrow harvest from iliac crest. (Right: Foot) .  States late yesterday she lost balance and put weight on foot. Pain is 7/10 lying and does not improve w/elevation and 9/10 when she gets up to go to the bathroom. States she has new onset of loss of sensation like feeling of nerve block feeling again from R foot mid arch  top and bottom and big toe and last 2 toes w/worsening in big toe and little toe. She can lift toes but can not longer bend toes \"scrunch\" toes like she could before this happened. This is a painful numbness and worse at injury site. Elevation does not help. Taking tylenol 500mg 2 tabs as ordered. Out of oxy. Toes do not feel swollen. Ankle does feel swollen. Toes feel cold but her house is cold. She is more concerned about the numbness and pain. Looked for an appt today and Dr Lachman is booked. Pt states that she does not feel that she can realistically get out of her house to get down steps for an appt at this time. Please review and advise. Thank you.   "

## 2025-04-11 NOTE — TELEPHONE ENCOUNTER
Patient calling back to speak with nurse, states she may have broke her splint. Warm transferred to triage nurse

## 2025-04-11 NOTE — TELEPHONE ENCOUNTER
Received transfer call and pt states now that she does think she broke her splint.  She thinks the right side back by the heel and she can feel a hole there which she just now noticed through the ace bandage. Can schedule pt for OIC tomorrow [place] [place] w/MARVIN Barajas or Dr Lachman [facility] on 4/15/24. Not sure if pt has transportation today, JESSICA Soto has 2:30 at [facility].     [facility] Secure Chat notification sent to Dr Lachman.

## 2025-04-11 NOTE — TELEPHONE ENCOUNTER
Called and spoke w/pt and relayed TALON Stewart's msg. Also explained about Narcan being prescribed. Reminded of f/u appt. CB if needed.

## 2025-04-11 NOTE — TELEPHONE ENCOUNTER
Caller: Patient    Doctor: Dr. Lachman    Reason for call: Patient lost balance and put weight on her right foot and is now having pain and lost of sensation in some of her toes.. She is 1 week post op. Please advise.    Call back#: [phone]

## 2025-04-13 ENCOUNTER — NURSE TRIAGE (OUTPATIENT)
Dept: OTHER | Facility: OTHER | Age: 40
End: 2025-04-13

## 2025-04-13 NOTE — TELEPHONE ENCOUNTER
"Regarding: Surg 1 week ago, Lost Balance and Cracked Cast, Now in Pain  ----- Message from Yessi GARCIA sent at 4/13/2025  6:40 PM EDT -----  I had Ortho Surgery 1 week ago, I called in on Friday, I lost balance on my crutches and put my foot down and Cracked my Cast. The crack is over the Surgical Site and I think there is a piece of Cast pushing on my ankle. I am in a lot of Pain.\"    "

## 2025-04-13 NOTE — TELEPHONE ENCOUNTER
"FOLLOW UP: Patient may need follow-up call to emergency room visit.     REASON FOR CONVERSATION: Post-op    SYMPTOMS: Patient states there is a hard piece of the broken cast against her ankle and causing more pain. Patient has elevated the leg, tried tylenol, used oxycodone, and is currently in pain. Patient asking if nerve block can be completed in emergency room.     OTHER: On call Provider paged and stated he was unsure of the nerve block being completed in ED, patient should utilize NSAIDs and tylenol and elevate, and he could send a prescription for pain medication. Patient stated she has elevated her leg, cannot take NSAIDs, she does not have a pharmacy that is open and she cannot wait until the morning. Emergency room was recommended as a last resort. Patient stated she may have to go to ED.     DISPOSITION: Call PCP Now      Reason for Disposition   [1] Post-op pain AND [2] not controlled with pain medications    Answer Assessment - Initial Assessment Questions  1. SYMPTOM: \"What's the main symptom you're concerned about?\" (e.g., drainage, incision opened up, pain, redness)        Crack in cast is broken and pushing on side of ankle since Friday. Oxycodone has covered the pain while laying down but patient is out of the medication.     2. ONSET: \"When did the issue  start?\"        Friday 4/11    3. SURGERY: \"What surgery did you have?\"         Ankle arthroscopy, debridement, microfracture and biocartilage procedure    4. DATE of SURGERY: \"When was the surgery?\"         4/3/25      7. PAIN: \"Is there any pain?\" If Yes, ask: \"How bad is it?\"  (Scale 1-10; or mild, moderate, severe)        Severe      10. FEVER: \"Do you have a fever?\" If Yes, ask: \"What is your temperature, how was it measured, and when did it start?\"          Denies    Protocols used: Post-Op Incision Symptoms and Questions-Adult-    "

## 2025-04-14 ENCOUNTER — TELEPHONE (OUTPATIENT)
Dept: OBGYN CLINIC | Facility: HOSPITAL | Age: 40
End: 2025-04-14

## 2025-04-14 ENCOUNTER — OFFICE VISIT (OUTPATIENT)
Dept: OBGYN CLINIC | Facility: OTHER | Age: 40
End: 2025-04-14
Payer: COMMERCIAL

## 2025-04-14 ENCOUNTER — TELEPHONE (OUTPATIENT)
Dept: OBGYN CLINIC | Facility: OTHER | Age: 40
End: 2025-04-14

## 2025-04-14 VITALS — HEIGHT: 66 IN | BODY MASS INDEX: 24.11 KG/M2 | WEIGHT: 150 LBS

## 2025-04-14 DIAGNOSIS — M25.371 ANKLE INSTABILITY, RIGHT: ICD-10-CM

## 2025-04-14 DIAGNOSIS — M95.8 OSTEOCHONDRAL DEFECT OF TALUS: Primary | ICD-10-CM

## 2025-04-14 DIAGNOSIS — Z98.890 HISTORY OF ANKLE SURGERY: Primary | ICD-10-CM

## 2025-04-14 PROCEDURE — 99024 POSTOP FOLLOW-UP VISIT: CPT | Performed by: PHYSICIAN ASSISTANT

## 2025-04-14 PROCEDURE — 29405 APPL SHORT LEG CAST: CPT

## 2025-04-14 RX ORDER — ESTRADIOL 0.05 MG/D
PATCH TRANSDERMAL
COMMUNITY
Start: 2025-04-11

## 2025-04-14 RX ORDER — OXYCODONE HYDROCHLORIDE 5 MG/1
5 TABLET ORAL EVERY 4 HOURS PRN
Qty: 15 TABLET | Refills: 0 | Status: SHIPPED | OUTPATIENT
Start: 2025-04-14 | End: 2025-04-19

## 2025-04-14 NOTE — TELEPHONE ENCOUNTER
Called spoke w/pt and relayed Dr Lachman's msg. Pt states that she sent pictures via [employer] and the first picture is where the holes is in the splint by there heel and the second picture is where she is pointing where splint is concave. States she is unable to adjust splint. Loosening ace wrap will not help. Can't find a comfortable position. Worsening pain upon getting up to go to bathroom Please review and advise.

## 2025-04-14 NOTE — TELEPHONE ENCOUNTER
Patient was scheduled and seen by Ronny Barajas today. Patient presented with and dropped off a form that needs to be completed for the [facility]. Patient verbalized that she never received the work note stating that she needs to remain out of work after the cam boot was provided to patient and she is requesting if Dr. New can add the note to her chart for the paperwork that needs to be completed. Copy of paperwork has been scanned into media and original form was inter-officed to Madyson Wylie's team for completion as patient needs original document completed and returned to her.     Dr. New, please review to advise if note can be completed to document out of work status.

## 2025-04-14 NOTE — TELEPHONE ENCOUNTER
Epic Secure Chat notification sent to Dr Lachman to update him on plan of care for pt OIC at 3PM instead of her going to ED.

## 2025-04-14 NOTE — TELEPHONE ENCOUNTER
Caller: Patient    Doctor: Dr. Lachman    Reason for call: Patient is asking for a nurse to call her back as soon as possible. She didn't want to go into detail she said there are notes in the system from the weekend. Thank you.    Call back#: [phone]

## 2025-04-14 NOTE — TELEPHONE ENCOUNTER
Called and spoke w/pt and she is having severe pressure and pain on insicison right side of right ankle from cracked splint that has gotten worse over the weekend and had called doc on call. She did not go to ED as was aware Dr Lachman prefers not to go to ED and was waiting to call this AM. She is out of pain meds and her pharmacy was closed. Pain is constant and worse w/movement and when she gets OOB. She know she has appt tomorrow. She is asking what can be done until then or what can be done today as she has had enough and is in severe pain? Please review and advise.     FYI: Ronny Barajas PA-C is available OI today.

## 2025-04-14 NOTE — TELEPHONE ENCOUNTER
Called and spoke w/pt and she is at there witts end w/the pain caused by her damage splint. Instead of going to ED, pt scheduled for OIC at [facility] w/MARVIN Barajas. Pt is in agreement w/POC. She is aware will f/u w/Dr Lachman tomorrow.

## 2025-04-14 NOTE — PROGRESS NOTES
"Orthopaedic Surgery - Office Note  Ligia Abraham (39 y.o. female)   : 1985   MRN: [id number]  Encounter Date: 2025    Chief Complaint   Patient presents with    Right Ankle - Post-op         Assessment & Plan  S/P right ankle surgery-Right - Ankle arthroscopy. debridement. microfracture and biocartilage procedure Right - Modified Brostrom. Bone marrow harvest from iliac crest  Patient to be placed in a well-padded short leg cast in the office today in neutral position with mild hindfoot eversion.  Surgical incision was redressed with soft cloth adhesive wound dressing and ABDs.  Patient will follow cast care instructions.  She will elevate the foot above the level of the heart for edema control.  Prescription refill was performed today by surgical attending.  Patient will keep her appointment tomorrow with Dr. Lachman.          Return for Recheck with Dr. Lachman tomorrow.        History of Present Illness  Patient is here for a right splint evaluation after Right - Ankle arthroscopy. debridement. microfracture and biocartilage procedure with Modified Brostrom. Bone marrow harvest from iliac crest performed on 4/3/2025.  Patient reports she had a fall on 2025 where she put full weight on her right foot.  Patient is concerned that she cracked the cast and there is a piece pressing on her skin.  She has an appointment tomorrow with surgical attending.  Patient denies any fever chills or calf pain.    Review of Systems  Pertinent items are noted in HPI.  All other systems were reviewed and are negative.    Physical Exam  Ht 5' 6\" (1.676 m)   Wt 68 kg (150 lb)   BMI 24.21 kg/m²   Cons: Appears well.  No apparent distress.  Psych: Alert. Oriented x3.  Mood and affect normal.    Patient's right splint is completely intact today in the office.  The posterior lateral aspect of concern was where the vertical lateral and posterior splint material was not completely enclosed and not an actual fracture of " the splint.  There was no splinting material applying any pressure to skin or incision.  Lateral incision and portal sites are healing well with wound margins well aligned and no drainage.  There is no erythema warmth or signs of infection.  She was neurovascularly intact throughout the entire left foot and toes.  Dorsalis pedis and posterior tibial pulses were +2.  There was no calf tenderness.  Incisions were wiped with alcohol and 3M Medpor soft colostomy adhesive wound dressing was applied with multiple ABDs providing a well-padded incision site with a short leg cast being applied.          Studies Reviewed  Operative report, extensive phone messages, and physician communications were reviewed for today's visit.    Cast application    Date/Time: 4/14/2025 3:00 PM    Performed by: Myla Carballo MA  Authorized by: Ronny Barajas PA-C    Verbal consent obtained?: Yes    Risks and benefits: Risks, benefits and alternatives were discussed    Consent given by:  Patient  Time Out:     Time out: Immediately prior to the procedure a time out was called    Patient states understanding of procedure being performed: Yes    Patient's understanding of procedure matches consent: Yes    Relevant documents present and verified: Yes    Test results available and properly labeled: Yes    Site marked: Yes    Radiology Images displayed and confirmed. If images not available, report reviewed: Yes    Patient identity confirmed:  Verbally with patient  Pre-procedure details:     Sensation:  Normal    Skin color:  Wnl  Procedure details:     Laterality:  Right    Location:  Ankle    Ankle:  R ankle    Cast type:  Short leg (In neutral position with slight hindfoot eversion)    Splint composition: static    Post-procedure details:     Pain:  Improved    Sensation:  Normal    Skin color:  Wnl    Patient tolerance of procedure:  Tolerated well, no immediate complications      Medical, Surgical, Family, and Social History  The  patient's medical history, family history, and social history, were reviewed and updated as appropriate.    Past Medical History:   Diagnosis Date    Abdominal pain 11/20/2022    Acid reflux     Anxiety     Atypical mole syndrome     last assessed: 9/18/2015    Borderline personality disorder (HCC)     Cancer (HCC)     Thyroid CA    Cervical shortening suspected but not found     last assessed: 12/12/2013    Change in bowel habits     diarrhea with blood in stool    Change in mental status     last assessed: 7/11/2017    Chronic cholecystitis     Concussion wth loss of consciousness of 30 minutes or less 02/28/2022    COVID-19 01/2022    Depression     Disease of thyroid gland     thyroid cancer    Dyspepsia     Follicular thyroid cancer (HCC)     Functional murmur 09/01/2015    Gastric ulcer     GERD (gastroesophageal reflux disease)     Hallucination     Heart palpitations 07/12/2017    Hiatal hernia     Hypothyroid     Intractable abdominal pain 11/04/2022    Irritable bowel syndrome     last assessed: 12/15/2017    LLQ pain 03/07/2022    Migraine     Multiple neurological symptoms 05/21/2021    PONV (postoperative nausea and vomiting)     Psychiatric illness     PTSD (post-traumatic stress disorder)     Rash of hands     medication reaction    Seizure-like activity (HCC) 07/12/2017    Seizures (HCC)     Self-injurious behavior     Sleep difficulties     Syncope 10/24/2017    Tarry stools 11/20/2022    Thyroid disorder     Upper respiratory tract infection 07/18/2023       Past Surgical History:   Procedure Laterality Date    ABDOMINAL SURGERY      dedra fundiplication    APPENDECTOMY      ARTHROSCOPY ANKLE Right 4/3/2025    Procedure: Ankle arthroscopy, debridement, microfracture and biocartilage procedure;  Surgeon: James R Lachman, MD;  Location: [facility];  Service: Orthopedics    COLONOSCOPY N/A 10/30/2017    Procedure: COLONOSCOPY;  Surgeon: Bi Forde MD;  Location: [facility];  Service:  Gastroenterology    DENTAL SURGERY      ESOPHAGOGASTRIC FUNDOPLASTY  11/2015    nissen fundoplication    ESOPHAGOGASTRODUODENOSCOPY N/A 11/13/2017    Procedure: ESOPHAGOGASTRODUODENOSCOPY (EGD);  Surgeon: Bi Forde MD;  Location: [facility];  Service: Gastroenterology    FL LUMBAR PUNCTURE DIAGNOSTIC  7/7/2017    HYSTERECTOMY      TLHBS    HYSTEROSCOPY W/ ENDOMETRIAL ABLATION  04/29/2016    LAPAROSCOPIC CHOLECYSTECTOMY  2021    LYMPH NODE BIOPSY      excisional    NISSEN FUNDOPLICATION      MI ESOPHAGOGASTRODUODENOSCOPY TRANSORAL DIAGNOSTIC N/A 2/24/2016    Procedure: ESOPHAGOGASTRODUODENOSCOPY (EGD);  Surgeon: Vikash Buckner MD;  [facility];  Service: Gastroenterology    MI HYSTEROSCOPY BX ENDOMETRIUM&/POLYPC W/WO D&C N/A 4/29/2016    Procedure: D&C; HYSTEROSCOPY ;  Surgeon: Bernadette Yost DO;  [facility];  Service: Gynecology    MI HYSTEROSCOPY ENDOMETRIAL ABLATION N/A 4/29/2016    Procedure: NOVASURE ABLATION ;  Surgeon: Bernadette Yost DO;  [facility];  Service: Gynecology    MI LAPS ABD PRTM&OMENTUM DX W/WO SPEC BR/WA SPX N/A 5/22/2017    Procedure: LAPAROSCOPY DIAGNOSTIC, APPENDECTOMY;  Surgeon: Julius Griffin MD;  [facility];  Service: General    MI LAPS TOTAL HYSTERECT 250 GM/< W/RMVL TUBE/OVARY N/A 9/7/2016    Procedure: TOTAL LAPAROSCOPIC HYSTERECTOMY WITH BILATERAL SALPINGECTOMY ;  Surgeon: Bernadette Yost DO;  [facility];  Service: Gynecology    MI REPAIR SECONDARY DISRUPTED LIGAMENT ANKLE COLTRL Right 4/3/2025    Procedure: Modified Cathy, Bone marrow harvest from iliac crest.;  Surgeon: James R Lachman, MD;  [facility];  Service: Orthopedics    THYROIDECTOMY  06/2007    total    TUBAL LIGATION      UPPER GASTROINTESTINAL ENDOSCOPY         Family History   Problem Relation Age of Onset    Depression Mother     Depression Father     Diabetes Maternal Grandmother     Hypothyroidism Maternal Grandmother     Skin cancer Maternal  "Grandmother     Diabetes type II Maternal Grandmother     Crohn's disease Maternal Grandfather     Other Paternal Grandmother         blood dyscrasia    Bipolar disorder Cousin        Social History     Occupational History    Occupation: unemployed   Tobacco Use    Smoking status: Former     Current packs/day: 0.00     Average packs/day: 0.3 packs/day for 5.0 years (1.3 ttl pk-yrs)     Types: Cigarettes     Start date: 2000     Quit date: 2005     Years since quittin.6    Smokeless tobacco: Never   Vaping Use    Vaping status: Never Used   Substance and Sexual Activity    Alcohol use: Not Currently    Drug use: Yes     Types: Benzodiazepines, Marijuana     Comment: medical - marijuana; Rx for ativan    Sexual activity: Not Currently     Partners: Male     Birth control/protection: Surgical       Allergies   Allergen Reactions    Keppra [Levetiracetam] Rash    Prochlorperazine Anxiety and Other (See Comments)     Pt denies allergy, states it was \"accidentally put in\".     Tolerates when given with benadryl    Reglan [Metoclopramide] Anxiety    Valproic Acid Rash     Depakote     Depakote    Morphine Other (See Comments)     migraine    Amoxicillin Rash    Ibuprofen GI Intolerance     Has ulcer         Current Outpatient Medications:     ALPRAZolam (XANAX) 1 mg tablet, Take 1 tablet (1 mg total) by mouth 2 (two) times a day as needed for anxiety, Disp: 60 tablet, Rfl: 0    aspirin (ECOTRIN LOW STRENGTH) 81 mg EC tablet, Take 1 tablet (81 mg total) by mouth 2 (two) times a day, Disp: 84 tablet, Rfl: 0    estradiol (CLIMARA) 0.05 mg/24 hr, , Disp: , Rfl:     gabapentin (Neurontin) 100 mg capsule, Take 1 capsule (100 mg total) by mouth daily at bedtime, Disp: 30 capsule, Rfl: 2    levothyroxine 150 mcg tablet, Take 250 mcg by mouth daily, Disp: , Rfl:     oxyCODONE (ROXICODONE) 5 immediate release tablet, Take 1 tablet (5 mg total) by mouth every 4 (four) hours as needed for moderate pain for up to 5 days " Max Daily Amount: 30 mg, Disp: 15 tablet, Rfl: 0    prazosin (MINIPRESS) 2 mg capsule, Take 2 capsules (4 mg total) by mouth daily at bedtime, Disp: 180 capsule, Rfl: 2    prochlorperazine (COMPAZINE) 5 mg tablet, PRN, Disp: , Rfl:     sertraline (ZOLOFT) 100 mg tablet, Take 2 tablets (200 mg total) by mouth daily (Patient taking differently: Take 200 mg by mouth daily at bedtime), Disp: 180 tablet, Rfl: 2    tiZANidine (ZANAFLEX) 4 mg tablet, Take 4 mg by mouth every 6 (six) hours as needed for muscle spasms, Disp: , Rfl:     naloxone (NARCAN) 4 mg/0.1 mL nasal spray, Administer 1 spray into a nostril. If no response after 2-3 minutes, give another dose in the other nostril using a new spray., Disp: 1 each, Rfl: 1    ondansetron (ZOFRAN) 4 mg tablet, Take 1 tablet (4 mg total) by mouth every 8 (eight) hours as needed for nausea or vomiting, Disp: 10 tablet, Rfl: 0    oxyCODONE (Roxicodone) 5 immediate release tablet, Take 1 tablet (5 mg total) by mouth every 4 (four) hours as needed for severe pain for up to 30 doses Max Daily Amount: 30 mg, Disp: 30 tablet, Rfl: 0    oxyCODONE (Roxicodone) 5 immediate release tablet, Take 1 tablet (5 mg total) by mouth every 4 (four) hours as needed for moderate pain Max Daily Amount: 30 mg, Disp: 10 tablet, Rfl: 0      Ronny Barajas PA-C

## 2025-04-14 NOTE — PATIENT INSTRUCTIONS
CAST  INSTRUCTIONS:  You may or may not get a cast following surgery.  If you do, pay close attention to the following:     After application of a splint or cast, it is very important to elevate your leg for 24 to 72 hours.   The injured area should be elevated well above the heart.   Remember “Toes above your Nose”.  Rest and elevation greatly reduce pain and speed the healing process by minimizing early swelling.     CALL YOUR DOCTORS OFFICE OR VISIT LOCATION EMERGENCY ROOM IF YOU HAVE ANY OF THE FOLLOWING:     Significant increased pain, which may be caused by swelling (Strict elevation will alleviate this)  Numbness and tingling in your hand or foot, which may be caused by too much pressure on the nerves (There is always numbness after surgery due to nerve blocks)  Burning and stinging, which may be caused by too much pressure on the skin  Excessive swelling below the cast, which may mean the cast is slowing your blood circulation  Loss of active movement of toes, which request an urgent evaluation (if you have had a nerve block, this is an expected thing and totally normal)  Loss of “capillary refill”.  Pinch the tip of toes and epifanio the skin.  Release pressure and if the skin does not return pink then call the office immediately.        DO NOT GET YOUR CAST WET.   Bacteria thrive in moist dark areas.  We do not want this.   If your cast becomes wet, return to the office and we will apply another one.

## 2025-04-14 NOTE — TELEPHONE ENCOUNTER
Called patient she was on the phone with pablo when I called she got off the phone with her rto talk to me told her the same thing pablo told her and relayed Dr.lachman's message. She is upset that Dr.Lachman and Louise can not answer question right away I let her know that they are in the OR today and will answer as fast as they can. She can not get comfy with the splint and can not adjust it at all. She said if we don't have an answer she will go to the ED. She wants to talk to pablo again I will send a message to pablo to call her back.

## 2025-04-14 NOTE — TELEPHONE ENCOUNTER
Called and spoke w/pt and relayed Dr Lachman's msg. Pt states she is grateful for the care moving forward today.

## 2025-04-16 NOTE — TELEPHONE ENCOUNTER
Did you have pt out of work prior to visit with Dr. Lachman? We received court papers to be completed for this pt and pt is requesting work note from you from time she got the cam boot. Please let me know. Thank you

## 2025-04-21 NOTE — TELEPHONE ENCOUNTER
Case reviewed. Note placed on chart as discussed with Ms. Madyson Wylie via telephone on 4/21/25. thanks

## 2025-04-22 ENCOUNTER — OFFICE VISIT (OUTPATIENT)
Dept: OBGYN CLINIC | Facility: CLINIC | Age: 40
End: 2025-04-22

## 2025-04-22 VITALS — BODY MASS INDEX: 24.11 KG/M2 | HEIGHT: 66 IN | WEIGHT: 150 LBS

## 2025-04-22 DIAGNOSIS — M25.371 ANKLE INSTABILITY, RIGHT: ICD-10-CM

## 2025-04-22 DIAGNOSIS — M94.9 OSTEOCHONDRAL LESION OF TALAR DOME: Primary | ICD-10-CM

## 2025-04-22 DIAGNOSIS — M89.9 OSTEOCHONDRAL LESION OF TALAR DOME: Primary | ICD-10-CM

## 2025-04-22 DIAGNOSIS — Z98.890 HISTORY OF ANKLE SURGERY: ICD-10-CM

## 2025-04-22 PROCEDURE — 99024 POSTOP FOLLOW-UP VISIT: CPT | Performed by: ORTHOPAEDIC SURGERY

## 2025-04-22 RX ORDER — CALCIUM CARBONATE 500(1250)
1 TABLET,CHEWABLE ORAL DAILY
Qty: 60 TABLET | Refills: 0 | Status: SHIPPED | OUTPATIENT
Start: 2025-04-22

## 2025-04-22 NOTE — PROGRESS NOTES
"      James R Lachman, M.D.  Attending, Orthopaedic Surgery  Foot and Ankle  [facility]      ORTHOPAEDIC FOOT AND ANKLE POST-OP VISIT     Procedure:     Right ankle arthroscopy with Modified Broström, microfracture and biocartilage procedure with bone harvest from iliac crest       Date of surgery:   4/3/25  Assessment & Plan  Ankle instability, right    Osteochondral lesion of talar dome  PLAN  1. Weightbearing Status- NWB operative extremity  2. DVT prophylaxis- ASA 325mg BID  3. Continue to elevate 23hrs/day getting up 1x per hour to prevent a blood clot  4. Pain control- OTC pain medication  5. RTC in 3 weeks  6. Xrays needed next visit - no      History of Present Illness:   Chief Complaint:   Chief Complaint   Patient presents with    Post-op     Post op 3 weeks. Cast off and stiches out.   Ankle arthroscopy, debridement, microfracture and biocartilage procedure - Right   Modified Brostrom, Bone marrow harvest from iliac crest. - Right      Ligia Abraham is a 39 y.o. female who is being seen for post-operative visit for the above procedure. Pain is well controlled and the patient has successfully transitioned to OTC pain medicines.  she is taking ASA 325mg BID for DVT prophylaxis. Patient has been NWB in a short leg cast.      Review of Systems:  General- denies fever/chills  Respiratory- denies cough or SOB  Cardio- denies chest pain or palpitations  GI- denies abdominal pain  Musculoskeletal- Negative except noted above  Skin- denies rashes or wounds    Physical Exam:   Ht 5' 6\" (1.676 m)   Wt 68 kg (150 lb)   BMI 24.21 kg/m²   General/Constitutional: No apparent distress: well-nourished and well developed.  Eyes: normal ocular motion  Lymphatic: No appreciable lymphadenopathy  Respiratory: Non-labored breathing  Vascular: No edema, swelling or tenderness, except as noted in detailed exam.  Integumentary: No impressive skin lesions present, except as noted in detailed exam.  Neuro: No " ataxia or tremors noted  Psych: Normal mood and affect, oriented to person, place and time. Appropriate affect.  Musculoskeletal: Normal, except as noted in detailed exam and in HPI.    Examination    right        Incision Clean, dry, intact  Sutures Removed this visit    Ecchymosis none    Swelling Mild    Sensation Intact to light touch throughout sural, saphenous, superficial peroneal, deep peroneal and medial/lateral plantar nerve distributions.  Helena-Yasemin 5.07 filament (10g) testing deferred.    Cardiovascular Brisk capillary refill < 2 seconds,intact DP and PT pulses    Special Tests None      Imaging Studies:   No new imaging      James R. Lachman, MD  Foot & Ankle Surgery   Department of Orthopaedic Surgery  [facility]      I personally performed the service.    James R. Lachman, MD    Scribe Attestation      I,:  Gideon Armstrong am acting as a scribe while in the presence of the attending physician.:       I,:  James R Lachman, MD personally performed the services described in this documentation    as scribed in my presence.:

## 2025-04-22 NOTE — ASSESSMENT & PLAN NOTE
PLAN  1. Weightbearing Status- NWB operative extremity  2. DVT prophylaxis- ASA 325mg BID  3. Continue to elevate 23hrs/day getting up 1x per hour to prevent a blood clot  4. Pain control- OTC pain medication  5. RTC in 3 weeks  6. Xrays needed next visit - no

## 2025-04-22 NOTE — PATIENT INSTRUCTIONS
NWB in CAM boot    Continue aspirin/lovenox for blood clot prevention  May shower, do not soak in a tub/pool/ocean/etc for another 4 weeks.  Begin PT  Scar massage- pea sized amount of lotion, massage into scar for 5 minutes each day.  Compression stocking (Knee high, 20-30mm Hg) to be worn at all times while awake.  Recommend taking the following supplements: Vitamin D3- 4000 units per day and Calcium 1200 mg per day. This will help with bone healing.       Wear the boot at all times except when showering and in PT, even to sleep at night.

## 2025-04-29 DIAGNOSIS — M95.8 OSTEOCHONDRAL DEFECT OF TALUS: ICD-10-CM

## 2025-04-29 DIAGNOSIS — M25.371 ANKLE INSTABILITY, RIGHT: ICD-10-CM

## 2025-04-29 RX ORDER — ASPIRIN 81 MG/1
81 TABLET, COATED ORAL 2 TIMES DAILY
Qty: 60 TABLET | Refills: 1 | Status: SHIPPED | OUTPATIENT
Start: 2025-04-29

## 2025-05-02 ENCOUNTER — EVALUATION (OUTPATIENT)
Dept: PHYSICAL THERAPY | Facility: CLINIC | Age: 40
End: 2025-05-02
Payer: COMMERCIAL

## 2025-05-02 DIAGNOSIS — M25.371 ANKLE INSTABILITY, RIGHT: ICD-10-CM

## 2025-05-02 DIAGNOSIS — M25.571 ACUTE RIGHT ANKLE PAIN: Primary | ICD-10-CM

## 2025-05-02 PROCEDURE — 97162 PT EVAL MOD COMPLEX 30 MIN: CPT | Performed by: PHYSICAL MEDICINE & REHABILITATION

## 2025-05-02 NOTE — PROGRESS NOTES
PT Evaluation     Today's date: 2025  Patient name: Ligia Abraham  : 1985  MRN: [id number]  Referring provider: Lachman, James R, MD  Dx:   Encounter Diagnosis     ICD-10-CM    1. Acute right ankle pain  M25.571                      Assessment  Impairments: abnormal coordination, abnormal or restricted ROM, activity intolerance, impaired physical strength, pain with function and activity limitations    Assessment details: Pt presents to outpatient physical therapy with deficits as expected with post-op recovery including pain, decreased range of motion, altered Wbing status, and decreased strength all leading to limited activity tolerance. Pt is a good candidate for outpatient physical therapy and would benefit from skilled intervention to address limitations and achieve goals, ensure safe return to preferred activity. Thank you for this referral.   Understanding of Dx/Px/POC: good     Prognosis: good    Goals  ST. Patient will report 25% decrease in pain in 4 weeks.  2. Patient will demonstrate 25% improvement in ROM in 4 weeks.  3. Patient will demonstrate 1/2 grade improvement in strength in 4 weeks.    LT. Patient will be able to perform IADLS without restriction or pain by discharge.  2. Patient will be independent in HEP by discharge.  3. Patient will be able to return to recreational/work duties without restriction or pain by discharge.      Plan  Patient would benefit from: PT eval and skilled PT  Planned modality interventions: cryotherapy and thermotherapy: hydrocollator packs    Planned therapy interventions: IADL retraining, body mechanics training, flexibility, functional ROM exercises, home exercise program, neuromuscular re-education, manual therapy, postural training, strengthening, stretching, therapeutic activities, therapeutic exercise and joint mobilization    Frequency: 2x week  Duration in weeks: 6  Treatment plan discussed with: patient        Subjective  Evaluation    History of Present Illness  Mechanism of injury: Patient presents s/p modified Bronstom procedure 4/3/25 with Dr. Lachman. Patient presents on crutches today, splits time between crutches and knee scooter. Current difficulties with household tasks including standing to do dishes, many steps within and outside home.   Home set up: steps, tub  No current N/T.   Pain  At worst pain ratin (end of day)          Objective     Active Range of Motion     Right Ankle/Foot   Dorsiflexion (ke): -20 degrees   Plantar flexion: 48 degrees   Eversion: 8 degrees     Additional Active Range of Motion Details  Inversion to neutral, not tested with additional movement this date  Resistance vs. Pain with movement    Strength/Myotome Testing     Right Knee   Flexion: 4  Extension: 4    General Comments:      Ankle/Foot Comments   Incision sites appear without sgs of infection, no drainage, edges approximated without significant erythema  Mild bruising base of toes 2-3, some ecchymosis anterior ankle likely from combination of compression wrap and boot  - Patient provided with tubigrip for use vs. Wrap for compression  (-) Juan's           Precautions: R modified Brostrom procedure 4/3/25, NWBing in boot at all times, MD visit     Manuals                                                                 Neuro Re-Ed                                                                                                        Ther Ex             Toe AROM HEP            LAQ HEP            SLR             Ankle DF/PF AROM                                                                 Ther Activity                                       Gait Training                                       Modalities

## 2025-05-08 ENCOUNTER — APPOINTMENT (OUTPATIENT)
Dept: PHYSICAL THERAPY | Facility: CLINIC | Age: 40
End: 2025-05-08
Payer: COMMERCIAL

## 2025-05-13 ENCOUNTER — OFFICE VISIT (OUTPATIENT)
Dept: OBGYN CLINIC | Facility: CLINIC | Age: 40
End: 2025-05-13

## 2025-05-13 VITALS — BODY MASS INDEX: 24.21 KG/M2 | HEIGHT: 66 IN

## 2025-05-13 DIAGNOSIS — M94.9 OSTEOCHONDRAL LESION OF TALAR DOME: Primary | ICD-10-CM

## 2025-05-13 DIAGNOSIS — M25.371 ANKLE INSTABILITY, RIGHT: ICD-10-CM

## 2025-05-13 DIAGNOSIS — M89.9 OSTEOCHONDRAL LESION OF TALAR DOME: Primary | ICD-10-CM

## 2025-05-13 PROCEDURE — 99024 POSTOP FOLLOW-UP VISIT: CPT | Performed by: ORTHOPAEDIC SURGERY

## 2025-05-13 NOTE — ASSESSMENT & PLAN NOTE
1. Weightbearing Status - PWB operative extremity  2. DVT prophylaxis - Completed  3. Continue PT/HEP as directed  4. Pain control - OTC pain medication  5. RTC in 6 week(s)  6. Xrays needed next visit - No

## 2025-05-13 NOTE — PATIENT INSTRUCTIONS
4 days of partial weight bearing 50%  Then, 4 days of partial weight bearing 75%  Then, 4 days of partial weight bearing 90%  Then full weight bearing in the boot and wean your crutches/rolling walker.    Then, starting 5/25: 2 weeks of weightbearing as tolerated in the CAM boot.    You may begin weaning your boot and transitioning to a sneaker (6/8). It is important to do this gradually to avoid aggravating the healing process.    6/8, you may come out of the boot into a sneaker for 2 hours.  2. 6/9, you may come out of the boot into a sneaker for 4 hours,  3. The next day, you may come out of the boot into a sneaker for 6 hours.  4. Continue this (adding 2 hours per day) as you tolerate. For example, if you do 6 hours out of the boot into a sneaker and your foot swells more than usual at night and it is difficult to control the discomfort, do not advance to 8 hours the next day, stay at 6 hours until you are able to tolerate it.    It is essential to follow this protocol and not modify this.  No matter when you begin weaning the boot, it will be difficult and there will be swelling and soreness.  If you spend more time than is recommended in the boot, this process becomes longer and more painful.    Elevation, Ice and tylenol and staying off of it at night will be important to aide in this transition out of the boot. Swelling and soreness are normal as you begin to do more with the injured leg.    May DC aspirin/lovenox, no longer needed.  May shower  Continue PT  Scar massage- pea sized amount of lotion, massage into scar for 5 minutes each day.  Compression stocking (Knee high, 20-30mm Hg) to be worn at all times while awake.

## 2025-05-15 ENCOUNTER — APPOINTMENT (OUTPATIENT)
Dept: PHYSICAL THERAPY | Facility: CLINIC | Age: 40
End: 2025-05-15
Payer: COMMERCIAL

## 2025-05-19 ENCOUNTER — TELEPHONE (OUTPATIENT)
Age: 40
End: 2025-05-19

## 2025-05-19 ENCOUNTER — OFFICE VISIT (OUTPATIENT)
Dept: PHYSICAL THERAPY | Facility: CLINIC | Age: 40
End: 2025-05-19
Payer: COMMERCIAL

## 2025-05-19 DIAGNOSIS — M25.571 ACUTE RIGHT ANKLE PAIN: Primary | ICD-10-CM

## 2025-05-19 PROCEDURE — 97140 MANUAL THERAPY 1/> REGIONS: CPT | Performed by: PHYSICAL THERAPIST

## 2025-05-19 PROCEDURE — 97110 THERAPEUTIC EXERCISES: CPT | Performed by: PHYSICAL THERAPIST

## 2025-05-19 PROCEDURE — 97112 NEUROMUSCULAR REEDUCATION: CPT | Performed by: PHYSICAL THERAPIST

## 2025-05-19 NOTE — TELEPHONE ENCOUNTER
Caller: hermann    Doctor: Dr. Lachman    Reason for call: Patient tripped and twisted her ankle. She felt a very sharp stabbing pain in the incision area.  she is puting ice on and elevating it.   On call  notified    Call back#: [phone]

## 2025-05-19 NOTE — PROGRESS NOTES
Daily Note     Today's date: 2025  Patient name: Ligia Abraham  : 1985  MRN: [id number]  Referring provider: Lachman, James R, MD  Dx:   Encounter Diagnosis     ICD-10-CM    1. Acute right ankle pain  M25.571                      Subjective: Saw MD on  - Wbing instruction are as follows: 4 days of partial weight bearing 50% Then, 4 days of partial weight bearing 75%, Then, 4 days of partial weight bearing 90%  Then full weight bearing in the boot and wean your crutches/rolling walker.      Objective: See treatment diary below      Assessment: Good tolerance throughout.       Plan: Continue per plan of care.      Precautions: R modified Brostrom procedure 4/3/25, As of  new WBING directions - 4 days of partial weight bearing 50%  Then, 4 days of partial weight bearing 75%  Then, 4 days of partial weight bearing 90%  Then full weight bearing in the boot and wean your crutches/rolling walker.      Manuals             R ankle PROM  CINTHYA                                                  Neuro Re-Ed             DF/PF wobble board  Seated x20                                                                                         Ther Ex             Toe AROM HEP            LAQ HEP            SLR x3  1# 2x10           Ankle AROM all planes  2x10                                                  Recumbent bike  6'           Ther Activity                                       Gait Training                                       Modalities

## 2025-05-22 ENCOUNTER — APPOINTMENT (OUTPATIENT)
Dept: PHYSICAL THERAPY | Facility: CLINIC | Age: 40
End: 2025-05-22
Payer: COMMERCIAL

## 2025-05-27 DIAGNOSIS — F41.1 GENERALIZED ANXIETY DISORDER WITH PANIC ATTACKS: ICD-10-CM

## 2025-05-27 DIAGNOSIS — F41.0 GENERALIZED ANXIETY DISORDER WITH PANIC ATTACKS: ICD-10-CM

## 2025-05-27 RX ORDER — ALPRAZOLAM 1 MG/1
1 TABLET ORAL 2 TIMES DAILY PRN
Qty: 60 TABLET | Refills: 0 | OUTPATIENT
Start: 2025-05-27

## 2025-05-27 NOTE — TELEPHONE ENCOUNTER
Patient called back asking why refill was refused. She had sent a message to Dr Dhaliwal explaining to her why she had to cancel her appointment. Pt can schedule a virtual visit if needed. She had surgery on her foot and can not drive.

## 2025-05-27 NOTE — TELEPHONE ENCOUNTER
Psychiatrist not getting in with them for another two weeks..Can Dr Dhaliwal refill until then.?  She only has one left.  Xanax  Thanks.   Patient unable to complete

## 2025-05-28 DIAGNOSIS — F41.1 GENERALIZED ANXIETY DISORDER WITH PANIC ATTACKS: ICD-10-CM

## 2025-05-28 DIAGNOSIS — F41.0 GENERALIZED ANXIETY DISORDER WITH PANIC ATTACKS: ICD-10-CM

## 2025-05-28 RX ORDER — ALPRAZOLAM 1 MG/1
1 TABLET ORAL 2 TIMES DAILY PRN
Qty: 16 TABLET | Refills: 0 | Status: SHIPPED | OUTPATIENT
Start: 2025-05-28 | End: 2025-06-04 | Stop reason: SDUPTHER

## 2025-05-28 NOTE — TELEPHONE ENCOUNTER
Please let her know I sent 8d worth of alprazolam to get her to the Psychiatry appt scheduled for 6/4/25

## 2025-05-28 NOTE — TELEPHONE ENCOUNTER
Pt calling back about this.  Can someone call her back asap. Thanks.    I made a virtual appt for 6/2.  She only has one left of the xanax.  Can this be sent to pharmacy today.

## 2025-05-29 ENCOUNTER — APPOINTMENT (OUTPATIENT)
Dept: PHYSICAL THERAPY | Facility: CLINIC | Age: 40
End: 2025-05-29
Payer: COMMERCIAL

## 2025-06-04 ENCOUNTER — TELEPHONE (OUTPATIENT)
Dept: OBGYN CLINIC | Facility: HOSPITAL | Age: 40
End: 2025-06-04

## 2025-06-04 ENCOUNTER — TELEPHONE (OUTPATIENT)
Age: 40
End: 2025-06-04

## 2025-06-04 ENCOUNTER — TELEMEDICINE (OUTPATIENT)
Dept: PSYCHIATRY | Facility: CLINIC | Age: 40
End: 2025-06-04
Payer: COMMERCIAL

## 2025-06-04 DIAGNOSIS — Z13.21 SCREENING FOR ENDOCRINE, NUTRITIONAL, METABOLIC AND IMMUNITY DISORDER: ICD-10-CM

## 2025-06-04 DIAGNOSIS — F51.05 INSOMNIA DUE TO MENTAL DISORDER: ICD-10-CM

## 2025-06-04 DIAGNOSIS — Z13.0 SCREENING FOR ENDOCRINE, NUTRITIONAL, METABOLIC AND IMMUNITY DISORDER: ICD-10-CM

## 2025-06-04 DIAGNOSIS — F43.10 POST TRAUMATIC STRESS DISORDER (PTSD): Primary | ICD-10-CM

## 2025-06-04 DIAGNOSIS — Z13.29 SCREENING FOR ENDOCRINE, NUTRITIONAL, METABOLIC AND IMMUNITY DISORDER: ICD-10-CM

## 2025-06-04 DIAGNOSIS — F60.3 BORDERLINE PERSONALITY DISORDER (HCC): ICD-10-CM

## 2025-06-04 DIAGNOSIS — E55.9 VITAMIN D DEFICIENCY: ICD-10-CM

## 2025-06-04 DIAGNOSIS — E53.8 B12 DEFICIENCY: ICD-10-CM

## 2025-06-04 DIAGNOSIS — F41.1 GAD (GENERALIZED ANXIETY DISORDER): ICD-10-CM

## 2025-06-04 DIAGNOSIS — F33.0 MDD (MAJOR DEPRESSIVE DISORDER), RECURRENT EPISODE, MILD (HCC): ICD-10-CM

## 2025-06-04 DIAGNOSIS — Z13.228 SCREENING FOR ENDOCRINE, NUTRITIONAL, METABOLIC AND IMMUNITY DISORDER: ICD-10-CM

## 2025-06-04 PROCEDURE — 90792 PSYCH DIAG EVAL W/MED SRVCS: CPT | Performed by: PSYCHIATRY & NEUROLOGY

## 2025-06-04 RX ORDER — ARIPIPRAZOLE 5 MG/1
TABLET ORAL
Qty: 30 TABLET | Refills: 2 | Status: SHIPPED | OUTPATIENT
Start: 2025-06-04

## 2025-06-04 RX ORDER — ALPRAZOLAM 1 MG/1
1 TABLET ORAL 2 TIMES DAILY PRN
Qty: 30 TABLET | Refills: 0 | Status: SHIPPED | OUTPATIENT
Start: 2025-06-04 | End: 2025-07-04

## 2025-06-04 RX ORDER — SERTRALINE HYDROCHLORIDE 100 MG/1
200 TABLET, FILM COATED ORAL DAILY
Qty: 180 TABLET | Refills: 2 | Status: SHIPPED | OUTPATIENT
Start: 2025-06-04

## 2025-06-04 RX ORDER — PRAZOSIN HYDROCHLORIDE 5 MG/1
5 CAPSULE ORAL
Qty: 90 CAPSULE | Refills: 1 | Status: SHIPPED | OUTPATIENT
Start: 2025-06-04

## 2025-06-04 NOTE — BH TREATMENT PLAN
TREATMENT PLAN        [facility]    Name and Date of Birth:  Ligia Abraham 39 y.o. 1985  Date of Treatment Plan: June 4, 2025  Diagnosis/Diagnoses:    1. Post traumatic stress disorder (PTSD)    2. RUBIO (generalized anxiety disorder)    3. MDD (major depressive disorder), recurrent episode, mild (HCC)    4. Borderline personality disorder (bu history)    5. Insomnia due to mental disorder    6. Screening for endocrine, nutritional, metabolic and immunity disorder    7. Vitamin D deficiency    8. B12 deficiency        Strengths/Personal Resources for Self-Care: supportive friends, taking medications as prescribed, ability to adapt to life changes, ability to communicate needs, ability to communicate well, ability to listen, ability to reason, ability to understand psychiatric illness, average or above intelligence, general fund of knowledge, independence, motivation for treatment, ability to negotiate basic needs, being resoureceful, self-reliance, special hobby/interest, willingness to work on problems, work skills.    Area/Areas of need: anxiety, depression, mood instability, sleep problems, attention and concentration problems, behavioral problems, difficulty tolerating medications, family conflict, relationship problems, substance abuse, unemployment, unstable housing situation    Long Term Goal: improve anxiety, improve depression, improve ADHD symptoms, improve mood stability, improve impulse control, improve attention, improve concentration, improve appetite, Feel better about self, Feel calmer, Feel happier, and Feel more positive about the future  Target Date: 6 months - December 4, 2025  Person/Persons responsible for completion of goal: Nash Voss MD     Short Term Objective (s) - How will we reach this goal?:   Take medications as prescribed  Attend psychiatry appointments regularly  Get blood work drawn  Continue psychotherapy  regularly  Practice coping skills  Try sleep hygiene techniques  Avoid alcohol   Avoid drugs   Take walks regularly  Try breathing exercises  Try relaxation techniques  Target Date: 6 months - December 4, 2025  Person/Persons Responsible for Completion of Goal: Ligia     Progress Towards Goals: Continuing treatment    Treatment Modality: medication management every 1-3 months as needed, continue psychotherapy (if patient is currently involved in therapy), and follow up with PCP regularly  Review due 180 days from date of this plan: December 1, 2025   Expected length of service: Ongoing treatment    My physician and I have developed this plan together, and I agree to work on the goals and objectives. I understand the treatment goals that were developed for my treatment.    The treatment plan was created between Brandon Voss MD and Ligia Abraham on 06/04/25 at 12:17 PM but not signed at the time of the visit due to COVID social distancing. The plan was reviewed, and verbal consent was given.

## 2025-06-04 NOTE — PSYCH
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 0  0 - 2 % Final    Lymphocytes % 01/11/2023 18  14 - 44 % Final    Monocytes % 01/11/2023 4  4 - 12 % Final    Eosinophils Relative 01/11/2023 1  0 - 6 % Final    Basophils Relative 01/11/2023 1  0 - 1 % Final    Absolute Neutrophils 01/11/2023 7.73 (H)  1.85 - 7.62 Thousands/µL Final    Absolute Immature Grans 01/11/2023 0.02  0.00 - 0.20 Thousand/uL Final    Absolute Lymphocytes 01/11/2023 1.85  0.60 - 4.47 Thousands/µL Final    Absolute Monocytes 01/11/2023 0.38  0.17 - 1.22 Thousand/µL Final    Eosinophils Absolute 01/11/2023 0.08  0.00 - 0.61 Thousand/µL Final    Basophils Absolute 01/11/2023 0.10  0.00 - 0.10 Thousands/µL Final    Sodium 01/11/2023 139  135 - 147 mmol/L Final    Potassium 01/11/2023 3.6  3.5 - 5.3 mmol/L Final    Chloride 01/11/2023 103  96 - 108 mmol/L Final    CO2 01/11/2023 28  21 - 32 mmol/L Final    ANION GAP 01/11/2023 8  4 - 13 mmol/L Final    BUN 01/11/2023 15  5 - 25 mg/dL Final    Creatinine 01/11/2023 0.76  0.60 - 1.30 mg/dL Final    Standardized to IDMS reference method    Glucose 01/11/2023 89  65 - 140 mg/dL Final    If the patient is fasting, the ADA then defines impaired fasting glucose as > 100 mg/dL and diabetes as > or equal to 123 mg/dL.  Specimen collection should occur prior to Sulfasalazine administration due to the potential for  falsely depressed results. Specimen collection should occur prior to Sulfapyridine administration due to the potential for falsely elevated results.    Calcium 01/11/2023 8.9  8.3 - 10.1 mg/dL Final    AST 01/11/2023 21  5 - 45 U/L Final    Specimen collection should occur prior to Sulfasalazine administration due to the potential for falsely depressed results.     ALT 01/11/2023 25  12 - 78 U/L Final    Specimen collection should occur prior to Sulfasalazine administration due to the potential for falsely depressed results.     Alkaline Phosphatase 01/11/2023 74  46 - 116 U/L Final    Total Protein 01/11/2023 7.5  6.4 - 8.4 g/dL Final    Albumin 01/11/2023 4.2  3.5 - 5.0 g/dL Final    Total Bilirubin 01/11/2023 0.30  0.20 - 1.00 mg/dL Final    Use of this assay is not recommended for patients undergoing treatment with eltrombopag due to the potential for falsely elevated results.    eGFR 01/11/2023 99  ml/min/1.73sq m Final    Lipase 01/11/2023 85  73 - 393 u/L Final    SARS-CoV-2 01/11/2023 Negative  Negative Final         INFLUENZA A PCR 01/11/2023 Negative  Negative Final         INFLUENZA B PCR 01/11/2023 Negative  Negative Final         RSV PCR 01/11/2023 Negative  Negative Final         Ventricular Rate 01/11/2023 74  BPM Final    Atrial Rate 01/11/2023 74  BPM Final    MS Interval 01/11/2023 152  ms Final    QRSD Interval 01/11/2023 78  ms Final    QT Interval 01/11/2023 390  ms Final    QTC Interval 01/11/2023 432  ms Final    P Axis 01/11/2023 70  degrees Final    QRS [place] 01/11/2023 59  degrees Final    T Wave [place] 01/11/2023 50  degrees Final       -----------------------------------    Medications Risks/Benefits:      Risks, Benefits And Possible Side Effects Of Medications:    Risks, benefits, and alternatives to treatment were discussed and the importance of medication and treatment compliance was reviewed with the patient and they verbalize understanding and agreement for treatment.     Treatment  Plan:  The Treatment Plan was completed and signed.. If done today, the next plan will be due December 1, 2025.     The following interventions are recommended: follow-up for regularly scheduled psychiatry appointments (and if needed, agreeable to move appointment sooner), if patient is in psychotherapy, continue with regularly scheduled individual psychotherapy appointments, and contracts for safety at present - agrees to call Crisis Intervention Service or go to ED if feeling unsafe. Although patient's acute lethality risk is LOW, long-term/chronic lethality risk is mildly elevated given the suicide risk factors noted above. However, at the current moment, Ligia is future-oriented, forward-thinking, and demonstrates ability to act in a self-preserving manner as evidenced by volitionally seeking psychiatric evaluation and treatment today. To mitigate future risk, patient should adhere to treatment recommendations, avoid alcohol/illicit substance use, utilize community-based resources and familiar support, and prioritize mental health treatment.          Controlled Medication Discussion:   BENZODIAZEPINES and/or OPIOIDS: Ligia has been filling controlled prescriptions on time as prescribed according to [place] Prescription Drug Monitoring Program. Discussed with Ligia the risks of sedation, respiratory depression, impairment of ability to drive and potential for abuse and addiction related to treatment with benzodiazepine medications. She understands risk of treatment with benzodiazepine medications, agrees to not drive if feels impaired and agrees to take medications as prescribed. Discussed with Ligia Black Box warning on concurrent use of benzodiazepines and opioid medications including sedation, respiratory depression, coma and death. She understands the risk of treatment with benzodiazepines in addition to opioids and wants to continue taking those medications. Discussed with Ligia increased risk of  impairment related to concurrent use of benzodiazepines and hypnotic medications including excessive sedation, psychomotor impairment and respiratory depression. She understands the risk of treatment with benzodiazepines in addition to hypnotic medications and wants to continue taking those medications. Discussed with Ligia need to slowly taper off benzodiazepines as recommended by [place] Prescription Drug Monitoring Program, due to concurrent use of opioid medications and increased risk of sedation, respiratory depression, coma and death with that combination    PDMP Review         Value Time User    PDMP Reviewed  Yes 6/4/2025 11:21 AM Brandon Voss MD            Suicide/Homicide Risk Assessment:    The following interventions are recommended: continue medication management, continue psychotherapy (if patient is regularly seeing a therapist), contracts for safety at present - agrees to go to ED if feeling unsafe, contracts for safety at present - agrees to call Crisis Intervention Service if feeling unsafe. Although patient's acute lethality risk is low, long-term/chronic lethality risk is mildly elevated in the presence of the above mentioned psychiatric and psychosocial concerns. At the current moment, Ligia is future-oriented, forward-thinking, and demonstrates ability to act in a self-preserving manner as evidenced by volitionally presenting to the clinic today, seeking treatment. At this juncture, inpatient hospitalization is not currently warranted. To mitigate future risk, patient should adhere to the recommendations of this writer, avoid alcohol/illicit substance use, utilize community-based resources and familiar support and prioritize mental health treatment. Based on today's assessment and clinical criteria, Ligia Abraham contracts for safety and is not an imminent risk of harm to self or others. Outpatient level of care is deemed appropriate at this present time. Ligia understands that if  they are no longer able to contract for safety, they need to call/contact the outpatient office including this writer, call/contact crisis and/orattend to the nearest Emergency Department for immediate evaluation.    Presently, patient patient denies suicidal/homicidal ideation in addition to thoughts of self-injury; contracts for safety, see below for risk assessment.  At conclusion of evaluation, patient is amenable to the recommendations of this writer including: starting/continuing/adjusting psychotropic medications as prescribed.  Also, patient is amenable to calling/contacting the outpatient office including this writer if any acute adverse effects of their medication regimen arise in addition to any comments or concerns pertaining to their psychiatric management.  Patient is amenable to calling/contacting crisis and/or attending to the nearest emergency department if their clinical condition deteriorates to assure their safety and stability, stating that they are able to appropriately confide in their supports regarding their psychiatric state.    -----------------------------------    Medical Decision Making / Counseling / Coordination of Care:  Recent stressors were discussed with the patient. The diagnosis and treatment plan were reviewed with the patient. Risks, benefits, and alternativies to treatment were discussed, including a myriad of potential adverse medication side effects, to which Ligia voiced understanding and consented fully to treatment. The importance of medication and treatment compliance was reviewed with the patient. Individual psychotherapy provided: Supportive psychotherapy.     Note: This chart was completed utilizing speech software.  Grammatical errors, random word insertions, pronoun errors, and incomplete sentences are an occasional consequence of the system due to software limitation, ambient noise, and hardware issues.  Any formal questions or concerns about the context, text,  or information contained within the body of this dictation should be directly addressed to the physician for clarification.    Brandon Voss MD    This note was not shared with the patient due to reasonable likelihood of causing patient harm          [1]   Past Medical History:  Diagnosis Date    Abdominal pain 11/20/2022    Acid reflux     Anxiety     Atypical mole syndrome     last assessed: 9/18/2015    Borderline personality disorder (HCC)     Cancer (HCC)     Thyroid CA    Cervical shortening suspected but not found     last assessed: 12/12/2013    Change in bowel habits     diarrhea with blood in stool    Change in mental status     last assessed: 7/11/2017    Chronic cholecystitis     Concussion wth loss of consciousness of 30 minutes or less 02/28/2022    COVID-19 01/2022    Depression     Disease of thyroid gland     thyroid cancer    Dyspepsia     Follicular thyroid cancer (HCC)     Functional murmur 09/01/2015    Gastric ulcer     GERD (gastroesophageal reflux disease)     Hallucination     Heart palpitations 07/12/2017    Hiatal hernia     Hypothyroid     Intractable abdominal pain 11/04/2022    Irritable bowel syndrome     last assessed: 12/15/2017    LLQ pain 03/07/2022    Migraine     Multiple neurological symptoms 05/21/2021    PONV (postoperative nausea and vomiting)     Psychiatric illness     PTSD (post-traumatic stress disorder)     Rash of hands     medication reaction    Seizure-like activity (HCC) 07/12/2017    Seizures (HCC)     Self-injurious behavior     Sleep difficulties     Syncope 10/24/2017    Tarry stools 11/20/2022    Thyroid disorder     Upper respiratory tract infection 07/18/2023   [2]   Past Surgical History:  Procedure Laterality Date    ABDOMINAL SURGERY      dedra fundiplication    APPENDECTOMY      ARTHROSCOPY ANKLE Right 4/3/2025    Procedure: Ankle arthroscopy, debridement, microfracture and biocartilage procedure;  Surgeon: James R Lachman, MD;  Location: [facility];  Service: Orthopedics    COLONOSCOPY N/A 10/30/2017    Procedure: COLONOSCOPY;  Surgeon: Bi Forde MD;  Location: [facility];  Service: Gastroenterology    DENTAL SURGERY      ESOPHAGOGASTRIC FUNDOPLASTY  11/2015    nissen fundoplication    ESOPHAGOGASTRODUODENOSCOPY N/A 11/13/2017    Procedure: ESOPHAGOGASTRODUODENOSCOPY (EGD);  Surgeon: Bi Forde MD;  Location: [facility] GI LAB;  Service: Gastroenterology    FL LUMBAR PUNCTURE DIAGNOSTIC  7/7/2017    HYSTERECTOMY      TLHBS    HYSTEROSCOPY W/ ENDOMETRIAL ABLATION  04/29/2016    LAPAROSCOPIC CHOLECYSTECTOMY  2021    LYMPH NODE BIOPSY      excisional    NISSEN FUNDOPLICATION      MD ESOPHAGOGASTRODUODENOSCOPY TRANSORAL DIAGNOSTIC N/A 2/24/2016    Procedure: ESOPHAGOGASTRODUODENOSCOPY (EGD);  Surgeon: Vikash Buckner MD;  [facility];  Service: Gastroenterology    MD HYSTEROSCOPY BX ENDOMETRIUM&/POLYPC W/WO D&C N/A 4/29/2016    Procedure: D&C; HYSTEROSCOPY ;  Surgeon: Bernadette Yost DO;  [facility];  Service: Gynecology    MD HYSTEROSCOPY ENDOMETRIAL ABLATION N/A 4/29/2016    Procedure: NOVASURE ABLATION ;  Surgeon: Bernadette Yost DO;  [facility];  Service: Gynecology    MD LAPS ABD PRTM&OMENTUM DX W/WO SPEC BR/WA SPX N/A 5/22/2017    Procedure: LAPAROSCOPY DIAGNOSTIC, APPENDECTOMY;  Surgeon: Julius Griffin MD;  [facility];  Service: General    MD LAPS TOTAL HYSTERECT 250 GM/< W/RMVL TUBE/OVARY N/A 9/7/2016    Procedure: TOTAL LAPAROSCOPIC HYSTERECTOMY WITH BILATERAL SALPINGECTOMY ;  Surgeon: Bernadette Yost DO;  [facility];  Service: Gynecology    MD REPAIR SECONDARY DISRUPTED LIGAMENT ANKLE COLTRL Right 4/3/2025    Procedure: Modified Cathy, Bone marrow harvest from iliac crest.;  Surgeon: James R Lachman, MD;  [facility];  Service: Orthopedics    THYROIDECTOMY  06/2007    total    TUBAL LIGATION      UPPER GASTROINTESTINAL ENDOSCOPY     [3]   Family History  Problem Relation Name Age of  "Onset    Depression Mother      Depression Father      Diabetes Maternal Grandmother Maternal Grandmother     Hypothyroidism Maternal Grandmother Maternal Grandmother     Skin cancer Maternal Grandmother Maternal Grandmother     Diabetes type II Maternal Grandmother Maternal Grandmother     Crohn's disease Maternal Grandfather      Other Paternal Grandmother          blood dyscrasia    Bipolar disorder Cousin     [4]   Allergies  Allergen Reactions    Keppra [Levetiracetam] Rash    Prochlorperazine Anxiety and Other (See Comments)     Pt denies allergy, states it was \"accidentally put in\".     Tolerates when given with benadryl    Reglan [Metoclopramide] Anxiety    Valproic Acid Rash     Depakote     Depakote    Morphine Other (See Comments)     migraine    Amoxicillin Rash    Ibuprofen GI Intolerance     Has ulcer     "

## 2025-06-04 NOTE — TELEPHONE ENCOUNTER
Patient called to have her 10:30 am DAVID switched to a VV. Provider approved the change. Front office will change and will let patient know if there is an issue.

## 2025-06-04 NOTE — TELEPHONE ENCOUNTER
----- Message from Brandon Voss MD sent at 6/4/2025 11:15 AM EDT -----  Hello,This patient would like to be set up with [facility] individual in person therapy with a focus on trauma informed therapy and female therapist.  Can they be placed on the waitlist please.Thank you,Dr. Voss

## 2025-06-04 NOTE — PATIENT INSTRUCTIONS
"    Please call the office nursing staff for medication issues including refills, problems getting medications, bothersome side effects, etc., at [phone].     Please return for a follow up appointment as discussed and arrive approximately 15 minutes prior to your appointment time. If you are running late or are unable to attend your appointment, please call our [place] office at [phone] (fax: [phone]), or if you were seen in the [facility] office, please call [phone] (fax [phone]).    National Suicide Prevention Hotline: 1-647.973.7691 or call 158.    To improve sleep:  - Keep a consistent sleep schedule. Get up at the same time every day, even on weekends or during vacations.  - Set a bedtime that is early enough for you to get at least 7-8 hours of sleep.  - Don’t go to bed unless you are sleepy.  - If you don’t fall asleep after 20 minutes, get out of bed and take a brief \"break\". Go to a quiet activity without a lot of light exposure. It is especially important to not get on electronics. During this \"break,\" you might consider sitting in a chair and reading a boring book or listening to soft music, until your eyelids start to feel heavy.  Then, would go back to sleep and try again.    - Establish a relaxing bedtime routine.  - Make your bedroom quiet and relaxing. Keep the room at a comfortable, cool temperature.  - Limit exposure to bright light in the evenings.  - Turn off electronic devices at least 30 minutes before bedtime.  - Avoid watching stressful or suspenseful TV programs right before bedtime.  - Don’t eat a large meal before bedtime. If you are hungry at night, eat a light, healthy snack.  - Exercise regularly and maintain a healthy diet.  Participate in regular physical activities like exercise, although avoid approximately 3-4 hours prior to bed.  Morning exercise is ideal.  - Avoid consuming caffeine in the afternoon or evening.  - Avoid consuming alcohol " "before bedtime.  - Reduce your fluid intake before bedtime.  - Avoid daytime napping.  - Consider reading \"No More Sleepless nights\" by Medardo Silverman, Ph.D.  - Consider use of online resources including:  - http://Alum.ni/cbt-online-insomnia-treatment.html  - http://www.RoboteX  - CBT-I  Doe on your Smart Phone.  - Go! To Sleep by the [facility].    Look up \"grounding techniques\" and/or \"anchoring demonstration\" online and try a few to see what may work for you. Practice these skills before you need them, when you are not feeling too anxious or triggered. You can also search for free guided meditation videos online to help improve your head space when you are feeling very anxious or triggered.      Healthy Diet   The American Heart Association and the American College of Cardiology have long recommended a healthy diet for not only patients who are at risk for atherosclerotic cardiovascular disease (ASCVD) but also the general public. In keeping with this evidence-based recommendation, the \"2018 Guideline on the Management of Blood Cholesterol\" stresses that a healthy diet should include adequate intake of these essentials:   Vegetables, fruits, and whole grains   Legumes and nuts   Low-fat dairy products   Low-fat poultry (without the skin)   Fish and seafood   Nontropical vegetable oils     The recent guidelines do provide room for cultural food preferences in a healthy diet, but in general, all patients should limit their intake of saturated and avoid all trans fats, sweets, sugar-sweetened beverages, and red meats.  Please maintain adequate hydration of at least 2 Liters of water per day, and improve nutrition by decreasing portion sizes, avoiding fried foods, fast foods, inappropriate snacking and overly processed and packaged items with 'added sugars' (whether in drinks or foods), and observing nutritional facts information on any items that are packaged to reduce intake of saturated fats and " AVOID trans fats as mentioned above. Again, consume whole foods such as vegetables, higher lean protein intake, and using healthier lifestyle plans such as the Mediterranean diet with healthier fats such as those from seeds, nuts, and using organic extra virgin olive oil or avocado oil in lieu of processed vegetable oils or margarine.    Physical Activity   Recommended DAILY exercise for at least 150 minutes of moderate exercise weekly!  In addition to a healthy diet, all patients should include regular physical activity in their weekly routines, at moderate to vigorous intensity. Any activity is better than nothing, so if your patients can't meet the recommendation of vigorous activity, moderate-intensity activity can still help them reduce their risk of ASCVD.     Below are the American Heart Association's recommendations for physical activity per week (preferably spread throughout the week):     For Overall Cardiovascular Health and Lowering Cholesterol   At least 150 minutes of moderate-intensity physical activity (for example, 30 minutes, 5 days a week), or   At least 75 minutes of vigorous-intensity physical activity (for example, 25 minutes, 3 days a week); or   A combination of moderate- and vigorous-intensity aerobic activity, and   At least 2 days of moderate- to high-intensity muscle-strengthening activities (such as resistance weight training) for additional health benefits    If you have thoughts of harming yourself or are otherwise in psychological crisis, do not hesitate to contact your County Crisis hotline, or 911 or go to the nearest emergency room.  Adult Crisis Numbers  Suicide Prevention Hotline - Dial 9-8-8  [facility]: [phone] or [phone]  [facility]: [phone]  [facility]: [phone]  [facility]: [phone]  [place]: [phone] or [phone]  [facility]: [phone]  [facility]: [phone] or [facility] Crisis:  [phone]  [facility]: [phone] (daytime).       [phone] (after hours, weekends, holidays)     Child/Adolescent Crisis Numbers   [facility]: [phone]   [facility]: [phone]   [place]: [phone]   [place]: [phone]  Please note: Some [facility] do not have a separate number for Child/Adolescent specific crisis. If your county is not listed under Child/Adolescent, please call the adult number for your county     National Talk to Text Line   [facility] [employer] - 554-564    National Suicide Prevention Hotline: 1-378.987.1895 or call 061.

## 2025-06-04 NOTE — TELEPHONE ENCOUNTER
Called and spoke w/pt and relayed JESSICA Person's msg. Pt states so there is not anything else they can do for the pain. Advised she may contact her PCP for pain.  CB if symptoms worsen-swelling, redness, fever. Reminded of f/u appt.

## 2025-06-04 NOTE — TELEPHONE ENCOUNTER
Caller: Patient    Doctor: Dr. Lachman    Reason for call: Patient had surgery 4/3/25 on her right ankle. Injury was caused by falling on her landlord's property which she is in process of filing a claim. Mani recently had her evicted and she had only 10 days to get her things out. She is now living in a hotel. Unfortunately because she had to move everything in a short amount of time she thinks she definitely aggravated it during the moving process. Now she is having a lot of pain. She is keeping it elevated and has been staying in bed - ice on and off. Swelling has gone down but still very swollen. Pain level ( 7 ) and radiating up to knee. Can't walk in boot and using crutch again. Not sure if she should come in and get seen or what she should do. She does want to know if she can some sort of pain med. Please call patient. Thank you.     [id number] - [place], [place] - [address] [[id number]]     Call back#: [phone]

## 2025-06-04 NOTE — TELEPHONE ENCOUNTER
Called and spoke w/pt and she had on 4/3/25 Ankle arthroscopy, debridement, microfracture and biocartilage procedure (Right: Ankle)    Modified Cathy, Bone marrow harvest from iliac crest. (Right: Foot) . On 6/1/25, she had to move out of her rental property due to being evicted due to related issue w/landlord.  Pt hs been in excruciating pain since. Pain 7/10 that radiates from ankle to knee. She has been resting, icing, and elevating. Taking Tylenol ES. Advised not to take more than 3000mg in 24hours. She tried muscle relaxer Tizanidine and that did not help. She took a Motrin but has ulcer issues. Also advised that Dr Lachman does not recommend use of NSAIDs like motrin, advil, aleve, ibuprofen as it can delay healing. States she had been FWB with CAM boot without crutches. She was not able to bear weight but now she is back to PWB in CAM boot w/one crutch.  She is worried that she may have done something. Offered an appt today w/Dr Lachman and she said she could not make it. She would like Dr Lachman notified and if he could recommend/prescribe something for pain as she cannot take the pain. Please review and advise. Thank you.

## 2025-06-06 ENCOUNTER — TELEPHONE (OUTPATIENT)
Age: 40
End: 2025-06-06

## 2025-06-06 NOTE — TELEPHONE ENCOUNTER
The patient contacted the office, requesting to speak with an oncall therapist. The writer informed the patient that we did not have an on call therapist but he could attempt to get in contact with the clinical team.     The attempt was unsuccessful. The writer offered to send a message to the provider/ clinical for follow up.     Patient declined.

## 2025-06-12 ENCOUNTER — TELEPHONE (OUTPATIENT)
Age: 40
End: 2025-06-12

## 2025-06-12 NOTE — TELEPHONE ENCOUNTER
Pt called in requesting to have CM call her back. The patient states that they are in a housing crisis and have tried all outside options with no help. Pt would like to speak with someone  [phone]
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
## 2025-06-19 NOTE — TELEPHONE ENCOUNTER
Referral faxed to [facility] at [phone]. Fax confirmation imported into media.

## 2025-06-21 ENCOUNTER — NURSE TRIAGE (OUTPATIENT)
Dept: OTHER | Facility: OTHER | Age: 40
End: 2025-06-21

## 2025-06-22 NOTE — TELEPHONE ENCOUNTER
C/o  right toe injury, edema, bruising, and stiffness(still able to bend) after jamming toe under bathroom door. s/p Ankle arthroscopy, debridement, microfracture and biocartilage procedure (Right: Ankle), Modified Cathy, Bone marrow harvest from iliac crest. (Right: Foot) on 4/3 with James R Lachman, MD. Appointment with Ortho on 6/24.No additional symptoms reported.  Care advice given including education for recognition of severe signs/symptoms which would require immediate evaluation. Informed to call back if worsening/developing symptoms and/or uncertain. Verbalized understanding. Agreeable with disposition. No further questions.

## 2025-06-22 NOTE — TELEPHONE ENCOUNTER
"Regarding: Possible broken toe  ----- Message from Vanessa FENTON sent at 6/21/2025  7:49 PM EDT -----  \"I am calling because I believe I broke or fractured my big toe on the foot I had an operation on a couple months ago. I was getting out of the shower and slipped and my foot slid under the bathroom door. It is bruised and swollen and stiff.\"    "

## 2025-06-22 NOTE — TELEPHONE ENCOUNTER
"REASON FOR CONVERSATION: Toe Injury    SYMPTOMS: right toe injury, edema, bruising, and stiffness(still able to bend).     OTHER HEALTH INFORMATION: s/p Ankle arthroscopy, debridement, microfracture and biocartilage procedure (Right: Ankle), Modified Brostrom, Bone marrow harvest from iliac crest. (Right: Foot) on 4/3 with James R Lachman, MD.     PROTOCOL DISPOSITION: See PCP Within 3 Days    CARE ADVICE PROVIDED: Ice for 48 hours, followed by heat. Tylenol. Elevation, rest. Call back if worse    PRACTICE FOLLOW-UP: Ortho appointment on 6/24      Reason for Disposition   Large swelling or bruise    Answer Assessment - Initial Assessment Questions  1. MECHANISM: \"How did the injury happen?\"       Slipped and foot slid under bathroom door, jamming right toe underneath   2. ONSET: \"When did the injury happen?\" (e.g., minutes, hours ago)       6/21  3. LOCATION: \"What part of the toe is injured?\" \"Is the nail damaged?\"       Right toe  4. APPEARANCE of TOE INJURY: \"What does the injury look like?\"       Bruised, edema, and stiffness (still able to bend)  5. SEVERITY: \"Can you use the foot normally?\" \"Can you walk?\"       Mild difficulty   6. SIZE: For cuts, bruises, or swelling, ask: \"How large is it?\" (e.g., inches or centimeters;  entire toe)       Skin intact  7. PAIN: \"Is there pain?\" If Yes, ask: \"How bad is the pain?\"   \"What does it keep you from doing?\" (Scale 0-10; or none, mild, moderate, severe)      6/10 toe, and foot at 5/10  8. TETANUS: For any breaks in the skin, ask: \"When was the last tetanus booster?\"      Up to date   9. DIABETES: \"Do you have a history of diabetes or poor circulation in the feet?\"      Denies DM, and no issues with circulation (continues flushed)   10. OTHER SYMPTOMS: \"Do you have any other symptoms?\"         Stiffness (still able to bend), edema  11. PREGNANCY: \"Is there any chance you are pregnant?\" \"When was your last menstrual period?\"        Denies    Protocols used: Toe " Injury-Adult-AH

## 2025-06-23 ENCOUNTER — TELEPHONE (OUTPATIENT)
Age: 40
End: 2025-06-23

## 2025-06-23 DIAGNOSIS — F41.1 GAD (GENERALIZED ANXIETY DISORDER): ICD-10-CM

## 2025-06-23 DIAGNOSIS — F33.0 MDD (MAJOR DEPRESSIVE DISORDER), RECURRENT EPISODE, MILD (HCC): ICD-10-CM

## 2025-06-23 NOTE — TELEPHONE ENCOUNTER
Patient called and requested to speak with nursing regarding her medication. She reported that she does not feel any different on it. She wanted to know what next steps would be. Writer transferred her to nurse for assistance.

## 2025-06-23 NOTE — TELEPHONE ENCOUNTER
"Spoke with Patricia. She started Abilify 5 mg about 3 weeks ago at the last OV. She's not having SE, but does not feel any benefit from it. She \"feels nothing.\" Dr. Voss had said they might need to increase the dose as she reports, previously she was on 7 mg.     If Dr. Voss is okay with an increase, she could use the current bottle/dose to take 5 mg, 1.5 tabs for a total of 7.5 mg. Advised he may send it, so if there are any concerns with insurance coverage, they could be addressed before she would run out of medication. She verbalized understanding.     Her preferred pharmacy is [facility] on [address] in [place]. Best number is mobile in record.   "

## 2025-06-24 ENCOUNTER — OFFICE VISIT (OUTPATIENT)
Dept: OBGYN CLINIC | Facility: CLINIC | Age: 40
End: 2025-06-24

## 2025-06-24 VITALS — BODY MASS INDEX: 24.11 KG/M2 | HEIGHT: 66 IN | WEIGHT: 150 LBS

## 2025-06-24 DIAGNOSIS — S93.491A SPRAIN OF ANTERIOR TALOFIBULAR LIGAMENT OF RIGHT ANKLE, INITIAL ENCOUNTER: ICD-10-CM

## 2025-06-24 DIAGNOSIS — M94.9 OSTEOCHONDRAL LESION OF TALAR DOME: Primary | ICD-10-CM

## 2025-06-24 DIAGNOSIS — M89.9 OSTEOCHONDRAL LESION OF TALAR DOME: Primary | ICD-10-CM

## 2025-06-24 PROCEDURE — 99024 POSTOP FOLLOW-UP VISIT: CPT | Performed by: ORTHOPAEDIC SURGERY

## 2025-06-24 NOTE — PROGRESS NOTES
"      James R Lachman, M.D.  Attending, Orthopaedic Surgery  Foot and Ankle  [facility]      ORTHOPAEDIC FOOT AND ANKLE POST-OP VISIT     Procedure:     Right ankle arthroscopy with Broström, microfracture and biocartilage with bone harvest from iliac crest       Date of surgery:   4/3/25      PLAN  1. Weightbearing Status- WBAT operative extremity  2. DVT prophylaxis- Completed  3. Continue to elevate 23hrs/day getting up 1x per hour to prevent a blood clot  4. Pain control- OTC pain medication  5. RTC in 3 months  6. Xrays needed next visit - no     History of Present Illness:   Chief Complaint:   Chief Complaint   Patient presents with    Post-op     Post op 3 months out.   Ankle arthroscopy, debridement, microfracture and biocartilage procedure - Right   Modified Brostrom, Bone marrow harvest from iliac crest. - Right      Ligia Abraham is a 39 y.o. female who is being seen for post-operative visit for the above procedure. Pain is well controlled and the patient has successfully transitioned to OTC pain medicines.  she has completed ASA 81mg BID for DVT prophylaxis. Patient has been WBAT in a Sneaker.      Review of Systems:  General- denies fever/chills  Respiratory- denies cough or SOB  Cardio- denies chest pain or palpitations  GI- denies abdominal pain  Musculoskeletal- Negative except noted above  Skin- denies rashes or wounds    Physical Exam:   Ht 5' 6\" (1.676 m)   Wt 68 kg (150 lb)   BMI 24.21 kg/m²   General/Constitutional: No apparent distress: well-nourished and well developed.  Eyes: normal ocular motion  Lymphatic: No appreciable lymphadenopathy  Respiratory: Non-labored breathing  Vascular: No edema, swelling or tenderness, except as noted in detailed exam.  Integumentary: No impressive skin lesions present, except as noted in detailed exam.  Neuro: No ataxia or tremors noted  Psych: Normal mood and affect, oriented to person, place and time. Appropriate " affect.  Musculoskeletal: Normal, except as noted in detailed exam and in HPI.    Examination    right        Incision Clean, dry, intact  Sutures Previously removed.    Ecchymosis none    Swelling Mild    Sensation Intact to light touch throughout sural, saphenous, superficial peroneal, deep peroneal and medial/lateral plantar nerve distributions.  [place]-Yasemin 5.07 filament (10g) testing deferred.    Cardiovascular Brisk capillary refill < 2 seconds,intact DP and PT pulses    Special Tests None      Imaging Studies:   No new imaging        James R. Lachman, MD  Foot & Ankle Surgery   Department of Orthopaedic Surgery  [facility]      I personally performed the service.    James R. Lachman, MD    Scribe Attestation      I,:  Gideon Armstrong am acting as a scribe while in the presence of the attending physician.:       I,:  James R Lachman, MD personally performed the services described in this documentation    as scribed in my presence.:

## 2025-06-30 ENCOUNTER — TELEPHONE (OUTPATIENT)
Dept: PSYCHIATRY | Facility: CLINIC | Age: 40
End: 2025-06-30

## 2025-06-30 RX ORDER — ARIPIPRAZOLE 5 MG/1
7.5 TABLET ORAL DAILY
Qty: 135 TABLET | Refills: 1 | Status: SHIPPED | OUTPATIENT
Start: 2025-06-30

## 2025-06-30 RX ORDER — ALPRAZOLAM 1 MG/1
1 TABLET ORAL 2 TIMES DAILY PRN
Qty: 30 TABLET | Refills: 0 | Status: SHIPPED | OUTPATIENT
Start: 2025-06-30 | End: 2025-07-30

## 2025-06-30 NOTE — TELEPHONE ENCOUNTER
Ligia was transferred to this writer from the call center. She was requesting an update on her conversation with Catarina. I explained Dr. Voss was out of office and I would send him a message asking him to review. She is also requesting a refill of her xanax.

## 2025-06-30 NOTE — TELEPHONE ENCOUNTER
Spoke to Ligia Abraham for 10 minutes    States the dosage of 5 mg of Abilify she has felt no side effects and no positive benefit from it and she is agreeable with further optimization as discussed at prior appointment up to a dose of 7.5 mg daily and if tolerating we will increase up to a dose of 10 mg daily.  In the past with various other medications she has experienced akathisia and will closely monitor for these symptoms and reach out to note writer provider should these occur.  She needs a refill for Xanax which she takes once a day at most and is overall tolerating and not requiring more than this amount.  Adamantly denies any desires for self-harm and denies any SI, HI, AVH.  Follow up in August 2025.    Brandon Voss MD  06/30/25

## 2025-07-15 ENCOUNTER — OFFICE VISIT (OUTPATIENT)
Age: 40
End: 2025-07-15
Payer: COMMERCIAL

## 2025-07-15 ENCOUNTER — APPOINTMENT (OUTPATIENT)
Dept: RADIOLOGY | Age: 40
End: 2025-07-15
Payer: COMMERCIAL

## 2025-07-15 ENCOUNTER — TELEPHONE (OUTPATIENT)
Dept: OBGYN CLINIC | Facility: CLINIC | Age: 40
End: 2025-07-15

## 2025-07-15 VITALS
RESPIRATION RATE: 16 BRPM | OXYGEN SATURATION: 99 % | WEIGHT: 150 LBS | HEART RATE: 80 BPM | DIASTOLIC BLOOD PRESSURE: 78 MMHG | HEIGHT: 66 IN | SYSTOLIC BLOOD PRESSURE: 122 MMHG | BODY MASS INDEX: 24.11 KG/M2

## 2025-07-15 DIAGNOSIS — M25.561 ACUTE PAIN OF RIGHT KNEE: ICD-10-CM

## 2025-07-15 DIAGNOSIS — M25.562 ACUTE PAIN OF LEFT KNEE: Primary | ICD-10-CM

## 2025-07-15 PROCEDURE — 99214 OFFICE O/P EST MOD 30 MIN: CPT | Performed by: INTERNAL MEDICINE

## 2025-07-15 PROCEDURE — 73564 X-RAY EXAM KNEE 4 OR MORE: CPT

## 2025-07-15 RX ORDER — OXYCODONE HYDROCHLORIDE 5 MG/1
5 TABLET ORAL EVERY 4 HOURS PRN
Qty: 10 TABLET | Refills: 0 | Status: SHIPPED | OUTPATIENT
Start: 2025-07-15

## 2025-07-16 ENCOUNTER — TELEPHONE (OUTPATIENT)
Age: 40
End: 2025-07-16

## 2025-07-16 NOTE — TELEPHONE ENCOUNTER
Patient called to check if Dr Dhaliwal is able to view the actual image of her knee xray, even though it has not been released from radiology yet. She was hoping Dr Dhaliwal may be able to order via DME a knee brace/support for stability and pain relief based on the images. She is keeping her leg elevated and icing. Please advise and follow up with patient thank you

## 2025-07-17 DIAGNOSIS — M25.562 ACUTE PAIN OF LEFT KNEE: Primary | ICD-10-CM

## 2025-07-17 NOTE — TELEPHONE ENCOUNTER
Patient called, extremely frustrated that she has been calling for the last 2 days with no response from provider. Patient is calling regarding brace/support, states she unable to leave her house on crutches recommended per Dr. Dhaliwal. Demanding on call provider be contacted regarding this. Provider messaged.

## 2025-07-17 NOTE — TELEPHONE ENCOUNTER
"Attempted to call patient twice, went straight to voicemail, left message to call back. Message from Dr. Dhaliwal:    \"I will put in the order and see if insurance covers it but other option is to purchase on her own.\"    Order for DME was placed and sent to [facility].  "

## 2025-07-17 NOTE — TELEPHONE ENCOUNTER
Patient called back and was told the order was written and sent to the insurance company and we will see if insurance will cover.

## 2025-07-18 ENCOUNTER — APPOINTMENT (OUTPATIENT)
Dept: RADIOLOGY | Facility: AMBULARY SURGERY CENTER | Age: 40
End: 2025-07-18
Attending: ORTHOPAEDIC SURGERY
Payer: COMMERCIAL

## 2025-07-18 ENCOUNTER — TELEPHONE (OUTPATIENT)
Age: 40
End: 2025-07-18

## 2025-07-18 ENCOUNTER — OFFICE VISIT (OUTPATIENT)
Dept: OBGYN CLINIC | Facility: CLINIC | Age: 40
End: 2025-07-18
Payer: COMMERCIAL

## 2025-07-18 VITALS — BODY MASS INDEX: 24.11 KG/M2 | HEIGHT: 66 IN | WEIGHT: 150 LBS

## 2025-07-18 DIAGNOSIS — M25.562 ACUTE PAIN OF LEFT KNEE: ICD-10-CM

## 2025-07-18 DIAGNOSIS — S83.005A PATELLAR DISLOCATION, LEFT, INITIAL ENCOUNTER: Primary | ICD-10-CM

## 2025-07-18 PROCEDURE — 99214 OFFICE O/P EST MOD 30 MIN: CPT | Performed by: ORTHOPAEDIC SURGERY

## 2025-07-18 PROCEDURE — 73562 X-RAY EXAM OF KNEE 3: CPT

## 2025-07-18 NOTE — TELEPHONE ENCOUNTER
"Caller: Ligia    Doctor: Dr. Morales    Reason for call: Patient was given a TROM brace today OVN stated: \"She will be in a TROm antonino din extension with weightbearing as tolerated\" she would like to know if she has to wear this to sleep or not?    Call back#: [phone]   "

## 2025-07-18 NOTE — ASSESSMENT & PLAN NOTE
New x-rays of the left knee were obtained today and reviewed with the patient  On exam patient has ecchymosis, effusion, tenderness on both the medial and lateral retinaculum with positive apprehension test.  At this time it is recommended to obtain an MRI of the left knee to further evaluate for an OCD lesion with patellar dislocation.  Due to patient suffering from PTSD and cannot tolerate MRIs,  due to the loud noises, without sedation or medication. Patient has Xanex and may take a dose prior to.   She will be in a TROm antonino din extension with weightbearing as tolerated.   We will follow up once MRI is obtained.   Orders:    T-Rom  Post Op Knee Brace    MRI knee left  wo contrast; Future
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
## 2025-07-18 NOTE — ASSESSMENT & PLAN NOTE
New x-rays of the left knee were obtained today and reviewed with the patient    Orders:    Ambulatory Referral to Orthopedic Surgery    XR knee 3 vw left non injury; Future    T-Rom  Post Op Knee Brace    MRI knee left  wo contrast; Future

## 2025-07-28 DIAGNOSIS — F41.1 GAD (GENERALIZED ANXIETY DISORDER): ICD-10-CM

## 2025-07-28 RX ORDER — ALPRAZOLAM 1 MG/1
1 TABLET ORAL 2 TIMES DAILY PRN
Qty: 30 TABLET | Refills: 0 | Status: SHIPPED | OUTPATIENT
Start: 2025-07-28 | End: 2025-08-27

## 2025-07-31 ENCOUNTER — HOSPITAL ENCOUNTER (OUTPATIENT)
Dept: MRI IMAGING | Facility: HOSPITAL | Age: 40
Discharge: HOME | End: 2025-07-31
Attending: ORTHOPAEDIC SURGERY
Payer: COMMERCIAL

## 2025-07-31 DIAGNOSIS — M25.562 ACUTE PAIN OF LEFT KNEE: ICD-10-CM

## 2025-07-31 DIAGNOSIS — S83.005A PATELLAR DISLOCATION, LEFT, INITIAL ENCOUNTER: ICD-10-CM

## 2025-07-31 PROCEDURE — 73721 MRI JNT OF LWR EXTRE W/O DYE: CPT

## 2025-08-01 ENCOUNTER — OFFICE VISIT (OUTPATIENT)
Dept: OBGYN CLINIC | Facility: CLINIC | Age: 40
End: 2025-08-01
Payer: COMMERCIAL

## 2025-08-01 VITALS — HEIGHT: 66 IN | WEIGHT: 150 LBS | BODY MASS INDEX: 24.11 KG/M2

## 2025-08-01 DIAGNOSIS — M70.42 PREPATELLAR BURSITIS OF LEFT KNEE: Primary | ICD-10-CM

## 2025-08-01 PROCEDURE — 99212 OFFICE O/P EST SF 10 MIN: CPT | Performed by: ORTHOPAEDIC SURGERY

## 2025-08-19 ENCOUNTER — TELEPHONE (OUTPATIENT)
Dept: PSYCHIATRY | Facility: CLINIC | Age: 40
End: 2025-08-19

## 2025-08-20 ENCOUNTER — TELEPHONE (OUTPATIENT)
Dept: PSYCHIATRY | Facility: CLINIC | Age: 40
End: 2025-08-20

## (undated) DEVICE — SPONGE SCRUB 4 PCT CHLORHEXIDINE

## (undated) DEVICE — BRUSH EZ SCRUB PCMX W/NAIL CLEANER

## (undated) DEVICE — PENCILETTE PUSH BUTTON COATED

## (undated) DEVICE — PADDING CAST 4 IN  COTTON STRL

## (undated) DEVICE — DRESSING MEPORE FILM ADHESIVE 4 X 5IN

## (undated) DEVICE — MAT ABSORBANT ARTHROSCOPY FLOOR 46 X 40 IN

## (undated) DEVICE — GLOVE SRG BIOGEL 8

## (undated) DEVICE — GLOVE INDICATOR PI UNDERGLOVE SZ 8 BLUE

## (undated) DEVICE — NEEDLE SPINAL18G X 3.5 IN QUINCKE

## (undated) DEVICE — NEEDLE 23G X 1 1/2 SAFETY-GLIDE THIN WALL

## (undated) DEVICE — TISSEEL FIBRIN 10 ML FROZEN

## (undated) DEVICE — GLOVE SRG BIOGEL 7.5

## (undated) DEVICE — SUT VICRYL 2-0 CT-2 18 IN J726D

## (undated) DEVICE — NEEDLE 18 G X 1 1/2 SAFETY

## (undated) DEVICE — CAST PADDING 6IN UNSTERILE

## (undated) DEVICE — BANDAGE, ESMARK LF STR 6"X9' (20/CS): Brand: CYPRESS

## (undated) DEVICE — DRAPE SHEET THREE QUARTER

## (undated) DEVICE — CUFF TOURNIQUET 30 X 4 IN QUICK CONNECT DISP 1BLA

## (undated) DEVICE — ANTIBACTERIAL UNDYED BRAIDED (POLYGLACTIN 910), SYNTHETIC ABSORBABLE SUTURE: Brand: COATED VICRYL

## (undated) DEVICE — SUT ETHILON 3-0 PS-1 18 IN 1663G

## (undated) DEVICE — ABDOMINAL PAD: Brand: DERMACEA

## (undated) DEVICE — BLADE SHAVER DISSECTOR 3.5MM 13CM COOLCUT

## (undated) DEVICE — OCCLUSIVE GAUZE STRIP,3% BISMUTH TRIBROMOPHENATE IN PETROLATUM BLEND: Brand: XEROFORM

## (undated) DEVICE — 3M™ MICROFOAM™ SURGICAL TAPE 4 ROLLS/CARTON 6 CARTONS/CASE 1528-3: Brand: 3M™ MICROFOAM™

## (undated) DEVICE — CHLORAPREP HI-LITE 26ML ORANGE

## (undated) DEVICE — BETHLEHEM UNIVERSAL  MIONR EXT: Brand: CARDINAL HEALTH

## (undated) DEVICE — SUT VICRYL 0 CT-1 36 IN J946H

## (undated) DEVICE — GUHL ANKLE DISTRACTOR FOOT STRAPS,                                    STERILE, LATEX FREE BOX OF 6

## (undated) DEVICE — GAUZE SPONGES,16 PLY: Brand: CURITY

## (undated) DEVICE — ACE WRAP 6 IN UNSTERILE

## (undated) DEVICE — CYSTO TUBING SINGLE IRRIGATION

## (undated) DEVICE — PAD CAST 4 IN COTTON NON STERILE

## (undated) DEVICE — INTENDED FOR TISSUE SEPARATION, AND OTHER PROCEDURES THAT REQUIRE A SHARP SURGICAL BLADE TO PUNCTURE OR CUT.: Brand: BARD-PARKER ® CARBON RIB-BACK BLADES

## (undated) DEVICE — SYRINGE 30ML LL

## (undated) DEVICE — MEDI-VAC YANKAUER SUCTION HANDLE W/STRAIGHT TIP & CONTROL VENT: Brand: CARDINAL HEALTH

## (undated) DEVICE — 3M™ DURAPORE™ SURGICAL TAPE 1538-1, 1 INCH X 10 YARD (2,5CM X 9,1M), 12 ROLLS/BOX: Brand: 3M™ DURAPORE™

## (undated) DEVICE — SPLINT 5 X 30 IN FAST SET PLASTER